# Patient Record
Sex: MALE | Race: WHITE
[De-identification: names, ages, dates, MRNs, and addresses within clinical notes are randomized per-mention and may not be internally consistent; named-entity substitution may affect disease eponyms.]

---

## 2018-09-07 ENCOUNTER — HOSPITAL ENCOUNTER (OUTPATIENT)
Dept: HOSPITAL 84 - D.CATH | Age: 83
Discharge: HOME | End: 2018-09-07
Attending: INTERNAL MEDICINE
Payer: MEDICARE

## 2018-09-07 VITALS — WEIGHT: 190.4 LBS | BODY MASS INDEX: 27.26 KG/M2 | BODY MASS INDEX: 27.26 KG/M2 | HEIGHT: 70 IN

## 2018-09-07 VITALS — DIASTOLIC BLOOD PRESSURE: 69 MMHG | SYSTOLIC BLOOD PRESSURE: 153 MMHG

## 2018-09-07 DIAGNOSIS — Z95.1: ICD-10-CM

## 2018-09-07 DIAGNOSIS — Z01.812: ICD-10-CM

## 2018-09-07 DIAGNOSIS — I25.119: Primary | ICD-10-CM

## 2018-09-07 LAB
ANION GAP SERPL CALC-SCNC: 11.4 MMOL/L (ref 8–16)
BUN SERPL-MCNC: 11 MG/DL (ref 7–18)
CALCIUM SERPL-MCNC: 9.2 MG/DL (ref 8.5–10.1)
CHLORIDE SERPL-SCNC: 104 MMOL/L (ref 98–107)
CO2 SERPL-SCNC: 27.2 MMOL/L (ref 21–32)
CREAT SERPL-MCNC: 1 MG/DL (ref 0.6–1.3)
ERYTHROCYTE [DISTWIDTH] IN BLOOD BY AUTOMATED COUNT: 13 % (ref 11.5–14.5)
GLUCOSE SERPL-MCNC: 105 MG/DL (ref 74–106)
HCT VFR BLD CALC: 48.7 % (ref 42–54)
HGB BLD-MCNC: 17 G/DL (ref 13.5–17.5)
LYMPHOCYTES NFR BLD AUTO: 28.6 % (ref 15–50)
MCH RBC QN AUTO: 31.2 PG (ref 26–34)
MCHC RBC AUTO-ENTMCNC: 34.9 G/DL (ref 31–37)
MCV RBC: 89.4 FL (ref 80–100)
NEUTROPHILS NFR BLD AUTO: 65 % (ref 40–80)
OSMOLALITY SERPL CALC.SUM OF ELEC: 274 MOSM/KG (ref 275–300)
PLATELET # BLD: 224 10X3/UL (ref 130–400)
PMV BLD AUTO: 10.2 FL (ref 7.4–10.4)
POTASSIUM SERPL-SCNC: 4.6 MMOL/L (ref 3.5–5.1)
RBC # BLD AUTO: 5.45 10X6/UL (ref 4.2–6.1)
SODIUM SERPL-SCNC: 138 MMOL/L (ref 136–145)
WBC # BLD AUTO: 7.2 10X3/UL (ref 4.8–10.8)

## 2018-09-07 PROCEDURE — 93459 L HRT ART/GRFT ANGIO: CPT

## 2018-09-17 ENCOUNTER — HOSPITAL ENCOUNTER (OUTPATIENT)
Dept: HOSPITAL 84 - D.CATH | Age: 83
Discharge: HOME | End: 2018-09-17
Attending: INTERNAL MEDICINE
Payer: MEDICARE

## 2018-09-17 VITALS — SYSTOLIC BLOOD PRESSURE: 142 MMHG | DIASTOLIC BLOOD PRESSURE: 61 MMHG

## 2018-09-17 VITALS — BODY MASS INDEX: 27.26 KG/M2 | WEIGHT: 190.4 LBS | BODY MASS INDEX: 27.26 KG/M2 | HEIGHT: 70 IN

## 2018-09-17 DIAGNOSIS — Z95.1: ICD-10-CM

## 2018-09-17 DIAGNOSIS — E78.5: ICD-10-CM

## 2018-09-17 DIAGNOSIS — Z95.5: ICD-10-CM

## 2018-09-17 DIAGNOSIS — I70.203: ICD-10-CM

## 2018-09-17 DIAGNOSIS — I10: ICD-10-CM

## 2018-09-17 DIAGNOSIS — I25.119: Primary | ICD-10-CM

## 2018-09-17 DIAGNOSIS — Z01.812: ICD-10-CM

## 2018-09-17 LAB
ANION GAP SERPL CALC-SCNC: 13.9 MMOL/L (ref 8–16)
BASOPHILS NFR BLD AUTO: 0.7 % (ref 0–2)
BUN SERPL-MCNC: 15 MG/DL (ref 7–18)
CALCIUM SERPL-MCNC: 8.9 MG/DL (ref 8.5–10.1)
CHLORIDE SERPL-SCNC: 104 MMOL/L (ref 98–107)
CO2 SERPL-SCNC: 23.8 MMOL/L (ref 21–32)
CREAT SERPL-MCNC: 0.8 MG/DL (ref 0.6–1.3)
EOSINOPHIL NFR BLD: 2.8 % (ref 0–7)
ERYTHROCYTE [DISTWIDTH] IN BLOOD BY AUTOMATED COUNT: 12.9 % (ref 11.5–14.5)
GLUCOSE SERPL-MCNC: 105 MG/DL (ref 74–106)
HCT VFR BLD CALC: 49.4 % (ref 42–54)
HGB BLD-MCNC: 17.1 G/DL (ref 13.5–17.5)
IMM GRANULOCYTES NFR BLD: 0.5 % (ref 0–5)
LYMPHOCYTES NFR BLD AUTO: 24.1 % (ref 15–50)
MCH RBC QN AUTO: 31.7 PG (ref 26–34)
MCHC RBC AUTO-ENTMCNC: 34.6 G/DL (ref 31–37)
MCV RBC: 91.7 FL (ref 80–100)
MONOCYTES NFR BLD: 13.3 % (ref 2–11)
NEUTROPHILS NFR BLD AUTO: 58.6 % (ref 40–80)
OSMOLALITY SERPL CALC.SUM OF ELEC: 274 MOSM/KG (ref 275–300)
PLATELET # BLD: 203 10X3/UL (ref 130–400)
PMV BLD AUTO: 11 FL (ref 7.4–10.4)
POTASSIUM SERPL-SCNC: 4.7 MMOL/L (ref 3.5–5.1)
RBC # BLD AUTO: 5.39 10X6/UL (ref 4.2–6.1)
SODIUM SERPL-SCNC: 137 MMOL/L (ref 136–145)
WBC # BLD AUTO: 8.4 10X3/UL (ref 4.8–10.8)

## 2019-03-10 ENCOUNTER — HOSPITAL ENCOUNTER (INPATIENT)
Dept: HOSPITAL 84 - D.ER | Age: 84
LOS: 2 days | Discharge: HOME | DRG: 378 | End: 2019-03-12
Attending: INTERNAL MEDICINE | Admitting: INTERNAL MEDICINE
Payer: MEDICARE

## 2019-03-10 VITALS — SYSTOLIC BLOOD PRESSURE: 108 MMHG | DIASTOLIC BLOOD PRESSURE: 63 MMHG

## 2019-03-10 VITALS — SYSTOLIC BLOOD PRESSURE: 92 MMHG | DIASTOLIC BLOOD PRESSURE: 58 MMHG

## 2019-03-10 VITALS — DIASTOLIC BLOOD PRESSURE: 75 MMHG | SYSTOLIC BLOOD PRESSURE: 123 MMHG

## 2019-03-10 VITALS — DIASTOLIC BLOOD PRESSURE: 65 MMHG | SYSTOLIC BLOOD PRESSURE: 132 MMHG

## 2019-03-10 VITALS — DIASTOLIC BLOOD PRESSURE: 74 MMHG | SYSTOLIC BLOOD PRESSURE: 133 MMHG

## 2019-03-10 VITALS — SYSTOLIC BLOOD PRESSURE: 106 MMHG | DIASTOLIC BLOOD PRESSURE: 65 MMHG

## 2019-03-10 VITALS — DIASTOLIC BLOOD PRESSURE: 70 MMHG | SYSTOLIC BLOOD PRESSURE: 95 MMHG

## 2019-03-10 VITALS
BODY MASS INDEX: 26.46 KG/M2 | BODY MASS INDEX: 26.46 KG/M2 | WEIGHT: 189.02 LBS | HEIGHT: 71 IN | HEIGHT: 71 IN | WEIGHT: 189.02 LBS | BODY MASS INDEX: 26.46 KG/M2

## 2019-03-10 VITALS — DIASTOLIC BLOOD PRESSURE: 61 MMHG | SYSTOLIC BLOOD PRESSURE: 111 MMHG

## 2019-03-10 VITALS — SYSTOLIC BLOOD PRESSURE: 102 MMHG | DIASTOLIC BLOOD PRESSURE: 89 MMHG

## 2019-03-10 VITALS — SYSTOLIC BLOOD PRESSURE: 113 MMHG | DIASTOLIC BLOOD PRESSURE: 77 MMHG

## 2019-03-10 VITALS — SYSTOLIC BLOOD PRESSURE: 119 MMHG | DIASTOLIC BLOOD PRESSURE: 62 MMHG

## 2019-03-10 DIAGNOSIS — Z95.1: ICD-10-CM

## 2019-03-10 DIAGNOSIS — I25.10: ICD-10-CM

## 2019-03-10 DIAGNOSIS — E78.5: ICD-10-CM

## 2019-03-10 DIAGNOSIS — K92.1: ICD-10-CM

## 2019-03-10 DIAGNOSIS — C85.90: ICD-10-CM

## 2019-03-10 DIAGNOSIS — K26.4: Primary | ICD-10-CM

## 2019-03-10 DIAGNOSIS — K31.5: ICD-10-CM

## 2019-03-10 DIAGNOSIS — K44.9: ICD-10-CM

## 2019-03-10 DIAGNOSIS — M19.90: ICD-10-CM

## 2019-03-10 LAB
ALBUMIN SERPL-MCNC: 3.8 G/DL (ref 3.4–5)
ALP SERPL-CCNC: 76 U/L (ref 46–116)
ALT SERPL-CCNC: 25 U/L (ref 10–68)
AMYLASE SERPL-CCNC: 62 U/L (ref 25–115)
ANION GAP SERPL CALC-SCNC: 13.6 MMOL/L (ref 8–16)
APPEARANCE UR: CLEAR
BASOPHILS NFR BLD AUTO: 0.2 % (ref 0–2)
BASOPHILS NFR BLD AUTO: 0.5 % (ref 0–2)
BILIRUB SERPL-MCNC: 0.55 MG/DL (ref 0.2–1.3)
BILIRUB SERPL-MCNC: NEGATIVE MG/DL
BUN SERPL-MCNC: 37 MG/DL (ref 7–18)
CALCIUM SERPL-MCNC: 9.1 MG/DL (ref 8.5–10.1)
CHLORIDE SERPL-SCNC: 103 MMOL/L (ref 98–107)
CO2 SERPL-SCNC: 27.6 MMOL/L (ref 21–32)
COLOR UR: (no result)
CREAT SERPL-MCNC: 1.1 MG/DL (ref 0.6–1.3)
EOSINOPHIL NFR BLD: 2 % (ref 0–7)
EOSINOPHIL NFR BLD: 2.1 % (ref 0–7)
ERYTHROCYTE [DISTWIDTH] IN BLOOD BY AUTOMATED COUNT: 13.1 % (ref 11.5–14.5)
ERYTHROCYTE [DISTWIDTH] IN BLOOD BY AUTOMATED COUNT: 13.4 % (ref 11.5–14.5)
GLOBULIN SER-MCNC: 3.3 G/L
GLUCOSE SERPL-MCNC: 114 MG/DL (ref 74–106)
GLUCOSE SERPL-MCNC: NEGATIVE MG/DL
HCT VFR BLD CALC: 41.4 % (ref 42–54)
HCT VFR BLD CALC: 45.6 % (ref 42–54)
HGB BLD-MCNC: 13.8 G/DL (ref 13.5–17.5)
HGB BLD-MCNC: 15.7 G/DL (ref 13.5–17.5)
IMM GRANULOCYTES NFR BLD: 2.1 % (ref 0–5)
IMM GRANULOCYTES NFR BLD: 2.4 % (ref 0–5)
INR PPP: 1.05 (ref 0.85–1.17)
KETONES UR STRIP-MCNC: NEGATIVE MG/DL
LIPASE SERPL-CCNC: 177 U/L (ref 73–393)
LYMPHOCYTES NFR BLD AUTO: 16.4 % (ref 15–50)
LYMPHOCYTES NFR BLD AUTO: 17.4 % (ref 15–50)
MCH RBC QN AUTO: 31.1 PG (ref 26–34)
MCH RBC QN AUTO: 31.2 PG (ref 26–34)
MCHC RBC AUTO-ENTMCNC: 33.3 G/DL (ref 31–37)
MCHC RBC AUTO-ENTMCNC: 34.4 G/DL (ref 31–37)
MCV RBC: 90.3 FL (ref 80–100)
MCV RBC: 93.5 FL (ref 80–100)
MONOCYTES NFR BLD: 10.6 % (ref 2–11)
MONOCYTES NFR BLD: 9.1 % (ref 2–11)
NEUTROPHILS NFR BLD AUTO: 68.6 % (ref 40–80)
NEUTROPHILS NFR BLD AUTO: 68.6 % (ref 40–80)
NITRITE UR-MCNC: NEGATIVE MG/ML
OSMOLALITY SERPL CALC.SUM OF ELEC: 288 MOSM/KG (ref 275–300)
PH UR STRIP: 5 [PH] (ref 5–6)
PLATELET # BLD: 237 10X3/UL (ref 130–400)
PLATELET # BLD: 282 10X3/UL (ref 130–400)
PMV BLD AUTO: 10.8 FL (ref 7.4–10.4)
PMV BLD AUTO: 11 FL (ref 7.4–10.4)
POTASSIUM SERPL-SCNC: 4.2 MMOL/L (ref 3.5–5.1)
PROT SERPL-MCNC: 7.1 G/DL (ref 6.4–8.2)
PROT UR-MCNC: NEGATIVE MG/DL
PROTHROMBIN TIME: 13.2 SECONDS (ref 11.6–15)
RBC # BLD AUTO: 4.43 10X6/UL (ref 4.2–6.1)
RBC # BLD AUTO: 5.05 10X6/UL (ref 4.2–6.1)
SODIUM SERPL-SCNC: 140 MMOL/L (ref 136–145)
SP GR UR STRIP: 1.02 (ref 1–1.02)
UROBILINOGEN UR-MCNC: NORMAL MG/DL
WBC # BLD AUTO: 12.9 10X3/UL (ref 4.8–10.8)
WBC # BLD AUTO: 14.1 10X3/UL (ref 4.8–10.8)

## 2019-03-10 NOTE — NUR
Shift assessment complete. Pt is A&O x4, with no complaints of pain or
discomfort. He is sitting up in bed with his glasses on, he has a ring and a
watch on his L wrist and hand. S1S2 audible, HR 63, NSR, BBB showing on
monitor. RR even and unlabored, clear lung sounds heard bilat throughout all
lobes. ABD soft and non-tender to touch, BS active x4. Emptied 250 mL clear
yellow urine. R AC PIV infusing NS @ 100 mL/hr and Protonix gtt @ 8 mg/hr.
Radial and pedal pulses palp. VSS. Call light in reach, bed in lowest
position. SCDs on, skin assessed, WNL. No further needs at this time. He
denies any needs. Will cont with POC.

## 2019-03-10 NOTE — NUR
PT RECIEVED TO UNIT AT THIS TIME. PT ALERT AND ORIENTED. STATES IS HAVING SOME
RT LOWER QUADRANT DISCOMFORT THAT IS DULL PAIN A LEVEL 2/10 AND STATES THAT HE
RCIEVED DEMEROL FOR PAIN AND BEFORE THAT HIS DISCOMFORT WAS A 6/10. NO ACUTE
DISTRESS NOTED. WILL CONTNIUE PLAN OF CARE.

## 2019-03-10 NOTE — NUR
SHIFT ASSESSMENT PERFORMED AT 1900 3/10/19, BACK DATED ON COMPUTER CHARTING BY
ACCIDENT TO 3/9/19, UNABLE TO CHANGE DATE ON [employer].

## 2019-03-10 NOTE — NUR
Wife and friend at bedside, he denies any needs at this time. VSS. Call light
in reach, bed in lowest position. Will cont with POC.

## 2019-03-11 VITALS — DIASTOLIC BLOOD PRESSURE: 62 MMHG | SYSTOLIC BLOOD PRESSURE: 138 MMHG

## 2019-03-11 VITALS — SYSTOLIC BLOOD PRESSURE: 115 MMHG | DIASTOLIC BLOOD PRESSURE: 65 MMHG

## 2019-03-11 VITALS — DIASTOLIC BLOOD PRESSURE: 60 MMHG | SYSTOLIC BLOOD PRESSURE: 99 MMHG

## 2019-03-11 VITALS — DIASTOLIC BLOOD PRESSURE: 56 MMHG | SYSTOLIC BLOOD PRESSURE: 107 MMHG

## 2019-03-11 VITALS — DIASTOLIC BLOOD PRESSURE: 64 MMHG | SYSTOLIC BLOOD PRESSURE: 101 MMHG

## 2019-03-11 VITALS — SYSTOLIC BLOOD PRESSURE: 126 MMHG | DIASTOLIC BLOOD PRESSURE: 76 MMHG

## 2019-03-11 VITALS — SYSTOLIC BLOOD PRESSURE: 142 MMHG | DIASTOLIC BLOOD PRESSURE: 62 MMHG

## 2019-03-11 VITALS — DIASTOLIC BLOOD PRESSURE: 74 MMHG | SYSTOLIC BLOOD PRESSURE: 110 MMHG

## 2019-03-11 VITALS — DIASTOLIC BLOOD PRESSURE: 69 MMHG | SYSTOLIC BLOOD PRESSURE: 104 MMHG

## 2019-03-11 VITALS — SYSTOLIC BLOOD PRESSURE: 108 MMHG | DIASTOLIC BLOOD PRESSURE: 63 MMHG

## 2019-03-11 VITALS — SYSTOLIC BLOOD PRESSURE: 105 MMHG | DIASTOLIC BLOOD PRESSURE: 67 MMHG

## 2019-03-11 VITALS — DIASTOLIC BLOOD PRESSURE: 65 MMHG | SYSTOLIC BLOOD PRESSURE: 115 MMHG

## 2019-03-11 VITALS — SYSTOLIC BLOOD PRESSURE: 95 MMHG | DIASTOLIC BLOOD PRESSURE: 64 MMHG

## 2019-03-11 VITALS — SYSTOLIC BLOOD PRESSURE: 117 MMHG | DIASTOLIC BLOOD PRESSURE: 68 MMHG

## 2019-03-11 VITALS — DIASTOLIC BLOOD PRESSURE: 65 MMHG | SYSTOLIC BLOOD PRESSURE: 141 MMHG

## 2019-03-11 VITALS — SYSTOLIC BLOOD PRESSURE: 107 MMHG | DIASTOLIC BLOOD PRESSURE: 83 MMHG

## 2019-03-11 LAB
ALBUMIN SERPL-MCNC: 3 G/DL (ref 3.4–5)
ALP SERPL-CCNC: 57 U/L (ref 46–116)
ALT SERPL-CCNC: 20 U/L (ref 10–68)
ANION GAP SERPL CALC-SCNC: 14.9 MMOL/L (ref 8–16)
BASOPHILS NFR BLD AUTO: 0.3 % (ref 0–2)
BILIRUB SERPL-MCNC: 0.85 MG/DL (ref 0.2–1.3)
BUN SERPL-MCNC: 25 MG/DL (ref 7–18)
CALCIUM SERPL-MCNC: 8.1 MG/DL (ref 8.5–10.1)
CHLORIDE SERPL-SCNC: 107 MMOL/L (ref 98–107)
CO2 SERPL-SCNC: 23.2 MMOL/L (ref 21–32)
CREAT SERPL-MCNC: 0.8 MG/DL (ref 0.6–1.3)
EOSINOPHIL NFR BLD: 1.4 % (ref 0–7)
EOSINOPHIL NFR BLD: 1.9 % (ref 0–7)
EOSINOPHIL NFR BLD: 2.3 % (ref 0–7)
ERYTHROCYTE [DISTWIDTH] IN BLOOD BY AUTOMATED COUNT: 13.4 % (ref 11.5–14.5)
GLOBULIN SER-MCNC: 2.6 G/L
GLUCOSE SERPL-MCNC: 107 MG/DL (ref 74–106)
HCT VFR BLD CALC: 36.9 % (ref 42–54)
HCT VFR BLD CALC: 37.5 % (ref 42–54)
HCT VFR BLD CALC: 37.5 % (ref 42–54)
HGB BLD-MCNC: 12.7 G/DL (ref 13.5–17.5)
HGB BLD-MCNC: 12.8 G/DL (ref 13.5–17.5)
HGB BLD-MCNC: 12.8 G/DL (ref 13.5–17.5)
IMM GRANULOCYTES NFR BLD: 1.7 % (ref 0–5)
IMM GRANULOCYTES NFR BLD: 1.8 % (ref 0–5)
IMM GRANULOCYTES NFR BLD: 1.8 % (ref 0–5)
LYMPHOCYTES NFR BLD AUTO: 16.9 % (ref 15–50)
LYMPHOCYTES NFR BLD AUTO: 18.7 % (ref 15–50)
LYMPHOCYTES NFR BLD AUTO: 20.9 % (ref 15–50)
MAGNESIUM SERPL-MCNC: 1.8 MG/DL (ref 1.8–2.4)
MCH RBC QN AUTO: 30.6 PG (ref 26–34)
MCH RBC QN AUTO: 30.8 PG (ref 26–34)
MCH RBC QN AUTO: 31 PG (ref 26–34)
MCHC RBC AUTO-ENTMCNC: 33.9 G/DL (ref 31–37)
MCHC RBC AUTO-ENTMCNC: 34.1 G/DL (ref 31–37)
MCHC RBC AUTO-ENTMCNC: 34.7 G/DL (ref 31–37)
MCV RBC: 89.3 FL (ref 80–100)
MCV RBC: 90.4 FL (ref 80–100)
MCV RBC: 90.4 FL (ref 80–100)
MONOCYTES NFR BLD: 8.6 % (ref 2–11)
MONOCYTES NFR BLD: 9.2 % (ref 2–11)
MONOCYTES NFR BLD: 9.5 % (ref 2–11)
NEUTROPHILS NFR BLD AUTO: 66.6 % (ref 40–80)
NEUTROPHILS NFR BLD AUTO: 67.4 % (ref 40–80)
NEUTROPHILS NFR BLD AUTO: 70.4 % (ref 40–80)
OSMOLALITY SERPL CALC.SUM OF ELEC: 284 MOSM/KG (ref 275–300)
PLATELET # BLD: 210 10X3/UL (ref 130–400)
PLATELET # BLD: 223 10X3/UL (ref 130–400)
PLATELET # BLD: 243 10X3/UL (ref 130–400)
PMV BLD AUTO: 10.5 FL (ref 7.4–10.4)
PMV BLD AUTO: 10.7 FL (ref 7.4–10.4)
PMV BLD AUTO: 11 FL (ref 7.4–10.4)
POTASSIUM SERPL-SCNC: 4.1 MMOL/L (ref 3.5–5.1)
PROT SERPL-MCNC: 5.6 G/DL (ref 6.4–8.2)
RBC # BLD AUTO: 4.13 10X6/UL (ref 4.2–6.1)
RBC # BLD AUTO: 4.15 10X6/UL (ref 4.2–6.1)
RBC # BLD AUTO: 4.15 10X6/UL (ref 4.2–6.1)
SODIUM SERPL-SCNC: 141 MMOL/L (ref 136–145)
WBC # BLD AUTO: 10.2 10X3/UL (ref 4.8–10.8)
WBC # BLD AUTO: 11.2 10X3/UL (ref 4.8–10.8)
WBC # BLD AUTO: 11.5 10X3/UL (ref 4.8–10.8)

## 2019-03-11 PROCEDURE — 0D798ZZ DILATION OF DUODENUM, VIA NATURAL OR ARTIFICIAL OPENING ENDOSCOPIC: ICD-10-PCS | Performed by: INTERNAL MEDICINE

## 2019-03-11 PROCEDURE — 0DB98ZX EXCISION OF DUODENUM, VIA NATURAL OR ARTIFICIAL OPENING ENDOSCOPIC, DIAGNOSTIC: ICD-10-PCS | Performed by: INTERNAL MEDICINE

## 2019-03-11 NOTE — NUR
Patient sleeping in bed with eyes closed, no s/s of distress at this time.
S1/S2 noted NSR on telemetry, breathing is even/unlabored on room air. All
pulses palpable with full ROM all extremities, denies pain or other needs at
this time. No BM at this time, all VSS and will continue to monitor.

## 2019-03-11 NOTE — NUR
Pt resting peacefully with no signs of acute distress noted. VSS. Call light
in reach, bed in lowest position. Will cont with POC.

## 2019-03-11 NOTE — NUR
Lab at bedside drawing CBC. He denies any needs at this time. VSS. Call light
in reach, will cont with POC.

## 2019-03-11 NOTE — MORECARE
CASE MANAGEMENT DISCHARGE SUMMARY
 
 
PATIENT: KUMAR YOUNG                   UNIT: [id number]
ACCOUNT#: [id number]                       ADM DATE: 03/10/19
AGE: 83     : 37  SEX: M            ROOM/BED: D.2306    
AUTHOR: BINH POLLARD                             PHYSICIAN:                               
 
REFERRING PHYSICIAN: LAUREL BABIN MD               
DATE OF SERVICE: 19
Discharge Plan
 
 
Patient Name: KUMAR YOUNG
[facility]
Encounter #: [id number]
Medical Record #: [id number]
: 1935
Planned Disposition: Home
Anticipated Discharge Date: 
 
Discharge Date: 
Expected LOS: 
Initial Reviewer: [id number]
Initial Review Date: 2019
Generated: 3/11/19   6:56 pm 
 DCPIA - Discharge Planning Initial Assessment
 
Updated by [id number]: Flower Ziegler on 3/11/19   5:54 pm
*  Is the patient Alert and Oriented?
Yes
*  How many steps to enter\exit or inside your home?
 
*  PCP
DR. LYNN -[facility]
*  Pharmacy
[facility]
*  Preadmission Environment
Home with Family
*  ADLs
Independent
*  Equipment
None
*  List name and contact numbers for known caregivers / representatives who 
currently or will assist patient after discharge:
MADHU YOUNG - WIFE- [phone]
*  Verbal permission to speak to the caregivers and representatives has been 
obtained from the patient.
Yes
*  Community resources currently utilized
 
None
*  Additional services required to return to the preadmission environment?
No
*  Can the patient safely return to the preadmission environment?
Yes
*  Has this patient been hospitalized within the prior 30 days at any 
hospital?
No
 
 
 
 
 
 
 
Last DP export: 3/11/19   4:48 p
Patient Name: KUMAR YOUNG
Encounter #: [id number]
Page [id number]
 
 
 
 
 
Electronically Signed by BINH POLLARD on 19 at 1756
 
 
 
 
 
 
**All edits/amendments must be made on the electronic document**
 
DICTATION DATE: 19     : RODRIGUEZ  19     
RPT#: [id number]                                DC DATE:        
                                               STATUS: ADM IN  
[facility]
 [place] [address]
***END OF REPORT***

## 2019-03-11 NOTE — NUR
Patient resting in bed with eyes open, HS meds given without difficulty.
Denies pain or other needs at this time, all VSS and will continue to monitor.

## 2019-03-11 NOTE — NUR
Reassessment complete. No changes in pt condition. No BM or bleeding seen at
this point. NS infusing @ 100 mL/hr, Protonix gtt @ 8 mg/hr. He is in good
spirits. VSS. Call light in reach, he is lying in bed playing on his phone. He
denies any pain. See flowsheet for further details. Will cont with POC.

## 2019-03-11 NOTE — NUR
Received patient resting in bed with eyes open, assessment completed per
flowsheet. Patient AO x4, follows instructions/answers appropriately. S1/S2
noted NSR on telemetry with HR 70, rythmic and regular. Breathing is
even/unlabored on room air with O2 sat 95%, lung sounds clear throughout. All
pulses palpable with cap refill < 3 sec, full ROM all extremities with skin
warm/dry. Denies pain or other needs at this time, see flowsheet for details.
All VSS and will continue to monitor.

## 2019-03-11 NOTE — MORECARE
CASE MANAGEMENT DISCHARGE SUMMARY
 
 
PATIENT: KUMAR YOUNG                   UNIT: [id number]
ACCOUNT#: [id number]                       ADM DATE: 03/10/19
AGE: 83     : 37  SEX: M            ROOM/BED: D.2306    
AUTHOR: LATRICE,DOC                             PHYSICIAN:                               
 
REFERRING PHYSICIAN: LAUREL BABIN MD               
DATE OF SERVICE: 19
Discharge Plan
 
 
Patient Name: KUMAR YOUNG
Facility: [facility]:[place]
Encounter #: [id number]
Medical Record #: [id number]
: 1935
Planned Disposition: Home
Anticipated Discharge Date: 
 
Discharge Date: 
Expected LOS: 
Initial Reviewer: [email or web address]
Initial Review Date: 2019
Generated: 3/11/19   7:05 pm 
Comments
 
DCP- Discharge Planning
 
Updated by [email or web address]: Flower Ziegler on 3/11/19   5:03 pm CT
Patient Name: KUMAR YOUNG                                     
Admission Status: ER   
Accout number: [id number]                              
Admission Date: 03-   
: 1935                                                        
Admission Diagnosis:   
Attending: LAUREL BABIN                                                
Current LOS:  1   
  
Anticipated DC Date:    
Planned Disposition: Home   
Primary Insurance: MEDICARE A & B   
  
  
Discharge Planning Comments: CM met with patient and spouse at bedside. 
Patient states he lives at home with is spouse (Josselyn). He plans on returning 
to their home upon discharge.  He states he feels safe at his home. He states 
he will have family drive him home upon discharge. He denies any discharge 
needs at this time. CM will continue to follow and assist as needed with 
 
discharge planning / needs.  
  
  
  
  
: Flower Ziegler
 DCPIA - Discharge Planning Initial Assessment
 
Updated by [id number]: Flower Ziegler on 3/11/19   5:54 pm
*  Is the patient Alert and Oriented?
Yes
*  How many steps to enter\exit or inside your home?
 
*  PCP
DR. LYNN [facility]
*  Pharmacy
[facility]
*  Preadmission Environment
Home with Family
*  ADLs
Independent
*  Equipment
None
*  List name and contact numbers for known caregivers / representatives who 
currently or will assist patient after discharge:
JOSSELYN YOUNG - WIFE- [phone]
*  Verbal permission to speak to the caregivers and representatives has been 
obtained from the patient.
Yes
*  Community resources currently utilized
None
*  Additional services required to return to the preadmission environment?
No
*  Can the patient safely return to the preadmission environment?
Yes
*  Has this patient been hospitalized within the prior 30 days at any 
hospital?
No
 
 
 
 
 
 
 
Last DP export: 3/11/19   4:56 p
Patient Name: KUMAR YOUNG
 
Encounter #: [id number]
Page [id number]
 
 
 
 
 
Electronically Signed by BINH POLLARD on 19 at 1805
 
 
 
 
 
 
**All edits/amendments must be made on the electronic document**
 
DICTATION DATE: 19     : RODRIGUEZ  19     
RPT#: [id number]                                DC DATE:        
                                               STATUS: ADM IN  
[facility]
 [place] [address]
***END OF REPORT***

## 2019-03-11 NOTE — MORECARE
CASE MANAGEMENT DISCHARGE SUMMARY
 
 
PATIENT: KUMAR YOUNG                   UNIT: [id number]
ACCOUNT#: [id number]                       ADM DATE: 03/10/19
AGE: 83     : 37  SEX: M            ROOM/BED: D.2306    
AUTHOR: BINH POLLARD                             PHYSICIAN:                               
 
REFERRING PHYSICIAN: LAUREL BABIN MD               
DATE OF SERVICE: 19
Discharge Plan
 
 
Patient Name: KUMAR YOUNG
[facility]
Encounter #: [id number]
Medical Record #: [id number]
: 1935
Planned Disposition: Home
Anticipated Discharge Date: 
 
Discharge Date: 
Expected LOS: 
Initial Reviewer: [email or web address]
Initial Review Date: 2019
Generated: 3/11/19   6:48 pm 
  
 
 
 
 
 
 
 
Patient Name: KUMAR YOUNG
 
Encounter #: [id number]
Page [id number]
 
 
 
 
 
Electronically Signed by BINH POLLARD on 19 at 1748
 
 
 
 
 
 
**All edits/amendments must be made on the electronic document**
 
DICTATION DATE: 19     : RODRIGUEZ  19     
RPT#: [id number]                                DC DATE:        
                                               STATUS: ADM IN  
[facility]
[address] [place] [address]
***END OF REPORT***

## 2019-03-12 VITALS — SYSTOLIC BLOOD PRESSURE: 114 MMHG | DIASTOLIC BLOOD PRESSURE: 58 MMHG

## 2019-03-12 VITALS — SYSTOLIC BLOOD PRESSURE: 107 MMHG | DIASTOLIC BLOOD PRESSURE: 56 MMHG

## 2019-03-12 LAB
ALBUMIN SERPL-MCNC: 3 G/DL (ref 3.4–5)
ALP SERPL-CCNC: 54 U/L (ref 46–116)
ALT SERPL-CCNC: 22 U/L (ref 10–68)
ANION GAP SERPL CALC-SCNC: 10.5 MMOL/L (ref 8–16)
BASOPHILS NFR BLD AUTO: 0.2 % (ref 0–2)
BILIRUB SERPL-MCNC: 0.72 MG/DL (ref 0.2–1.3)
BUN SERPL-MCNC: 20 MG/DL (ref 7–18)
CALCIUM SERPL-MCNC: 8.1 MG/DL (ref 8.5–10.1)
CHLORIDE SERPL-SCNC: 105 MMOL/L (ref 98–107)
CO2 SERPL-SCNC: 26.4 MMOL/L (ref 21–32)
CREAT SERPL-MCNC: 1.1 MG/DL (ref 0.6–1.3)
EOSINOPHIL NFR BLD: 1.8 % (ref 0–7)
ERYTHROCYTE [DISTWIDTH] IN BLOOD BY AUTOMATED COUNT: 13.2 % (ref 11.5–14.5)
GLOBULIN SER-MCNC: 2.6 G/L
GLUCOSE SERPL-MCNC: 101 MG/DL (ref 74–106)
HCT VFR BLD CALC: 36 % (ref 42–54)
HGB BLD-MCNC: 12.2 G/DL (ref 13.5–17.5)
IMM GRANULOCYTES NFR BLD: 1.1 % (ref 0–5)
LYMPHOCYTES NFR BLD AUTO: 14 % (ref 15–50)
MAGNESIUM SERPL-MCNC: 1.9 MG/DL (ref 1.8–2.4)
MCH RBC QN AUTO: 30.6 PG (ref 26–34)
MCHC RBC AUTO-ENTMCNC: 33.9 G/DL (ref 31–37)
MCV RBC: 90.2 FL (ref 80–100)
MONOCYTES NFR BLD: 10.4 % (ref 2–11)
NEUTROPHILS NFR BLD AUTO: 72.5 % (ref 40–80)
OSMOLALITY SERPL CALC.SUM OF ELEC: 278 MOSM/KG (ref 275–300)
PLATELET # BLD: 218 10X3/UL (ref 130–400)
PMV BLD AUTO: 10.6 FL (ref 7.4–10.4)
POTASSIUM SERPL-SCNC: 3.9 MMOL/L (ref 3.5–5.1)
PROT SERPL-MCNC: 5.6 G/DL (ref 6.4–8.2)
RBC # BLD AUTO: 3.99 10X6/UL (ref 4.2–6.1)
SODIUM SERPL-SCNC: 138 MMOL/L (ref 136–145)
WBC # BLD AUTO: 11.6 10X3/UL (ref 4.8–10.8)

## 2019-03-12 NOTE — MORECARE
CASE MANAGEMENT DISCHARGE SUMMARY
 
 
PATIENT: KUMAR YOUNG                   UNIT: [id number]
ACCOUNT#: [id number]                       ADM DATE: 03/10/19
AGE: 83     : 37  SEX: M            ROOM/BED: D.2306    
AUTHOR: BINH POLLARD                             PHYSICIAN:                               
 
REFERRING PHYSICIAN: LAUREL BABIN MD               
DATE OF SERVICE: 19
Discharge Plan
 
 
Patient Name: KUMAR YOUNG
Facility: [facility]:[place]
Encounter #: [id number]
Medical Record #: [id number]
: 1935
Planned Disposition: Home
Anticipated Discharge Date: 
 
Discharge Date: 2019
Expected LOS: 
Initial Reviewer: [email or web address]
Initial Review Date: 2019
Generated: 3/12/19  11:31 am 
Comments
 
DCP- Discharge Planning
 
Updated by [id number]: Flower Ziegler on 3/12/19   9:29 am CT
Patient Name:  KUMAR YOUNG   
Encounter No:  [id number]   
:  1935   
Primary Insurance:  MEDICARE A & B  
Anticipated DC Date:    
Planned Disposition:  Home  
External Planned Provider: :   
  
  
DCP follow-up note: Patient and family in agreement with discharge plan. No 
changes to plan. Case management will follow and assist as needed. D/C IMM 
explained and served 3/12/19 @0950  
  
Flower Ziegler
DCP- Discharge Planning
 
Updated by [id number]: Flower Ziegler on 3/11/19   5:03 pm CT
Patient Name: KUMAR YOUNG                                     
Admission Status: ER   
 
Accout number: [id number]                              
Admission Date: 03-   
: 1935                                                        
Admission Diagnosis:   
Attending: LAUREL BABIN                                                
Current LOS:  1   
  
Anticipated DC Date:    
Planned Disposition: Home   
Primary Insurance: MEDICARE A & B   
  
  
Discharge Planning Comments: CM met with patient and spouse at bedside. 
Patient states he lives at home with is spouse (Josselyn). He plans on returning 
to their home upon discharge.  He states he feels safe at his home. He states 
he will have family drive him home upon discharge. He denies any discharge 
needs at this time. CM will continue to follow and assist as needed with 
discharge planning / needs.  
  
  
  
  
: Flower Ziegler
 DCPIA - Discharge Planning Initial Assessment
 
Updated by [id number]: Flower Ziegler on 3/11/19   5:54 pm
*  Is the patient Alert and Oriented?
Yes
*  How many steps to enter\exit or inside your home?
 
*  PCP
DR. LYNN -[facility]
*  [facility]
*  Preadmission Environment
Home with Family
*  ADLs
Independent
*  Equipment
None
*  List name and contact numbers for known caregivers / representatives who 
currently or will assist patient after discharge:
JOSSELYN YOUNG - WIFE- [phone]
*  Verbal permission to speak to the caregivers and representatives has been 
obtained from the patient.
Yes
*  Community resources currently utilized
None
*  Additional services required to return to the preadmission environment?
No
*  Can the patient safely return to the preadmission environment?
Yes
 
*  Has this patient been hospitalized within the prior 30 days at any 
hospital?
No
 
 
 
 
 
Coverage Notice
 
Reviewer: [email or web address] - Flower Ziegler
 
Notice Issued Date-Time: 2019   9:50
Notice Type: IM Discharge Notice
 
Notice Delivered To: Patient
Relationship to Patient: Self
Representative Name: 
 
Delivery Method: HAND - Hand Delivered
Marlyn Days:
Prior Verbal Notification: 
 
Recipient Understood Notice: Yes
Recipient Signature: Yes
Med Rec Note Co-signed by Attending:
 
Coverage Notice Comment:  
 
Last DP export: 3/11/19   5:05 p
Patient Name: KUMAR YOUNG
Encounter #: [id number]
Page [id number]
 
 
 
 
 
Electronically Signed by BINH POLLARD on 19 at 1031
 
 
 
 
 
 
**All edits/amendments must be made on the electronic document**
 
DICTATION DATE: 19 1030     : RODRIGUEZ  19 1030     
RPT#: [id number]                                DC DATE:19
                                               STATUS: DIS IN  
[facility]
[address] [place] [address]
***END OF REPORT***

## 2019-03-12 NOTE — MORECARE
CASE MANAGEMENT DISCHARGE SUMMARY
 
 
PATIENT: KUMAR YOUNG                   UNIT: [id number]
ACCOUNT#: [id number]                       ADM DATE: 03/10/19
AGE: 83     : 37  SEX: M            ROOM/BED: D.2306    
AUTHOR: BINH POLLARD                             PHYSICIAN:                               
 
REFERRING PHYSICIAN: LAUREL BABIN MD               
DATE OF SERVICE: 19
Discharge Plan
 
 
Patient Name: KUMAR YOUNG
Facility: [facility]
Encounter #: [id number]
Medical Record #: [id number]
: 1935
Planned Disposition: Home
Anticipated Discharge Date: 
 
Discharge Date: 2019
Expected LOS: 
Initial Reviewer: [email or web address]
Initial Review Date: 2019
Generated: 3/12/19  11:41 am 
Comments
 
DCP- Discharge Planning
 
Updated by [id number]: Flower Ziegler on 3/12/19   9:29 am CT
Patient Name:  KUMAR YOUNG   
Encounter No:  [id number]   
:  1935   
Primary Insurance:  MEDICARE A & B  
Anticipated DC Date:    
Planned Disposition:  Home  
External Planned Provider: :   
  
  
DCP follow-up note: Patient and family in agreement with discharge plan. No 
changes to plan. Case management will follow and assist as needed. D/C IMM 
explained and served 3/12/19 @0950  
  
Flower Ziegler
DCP- Discharge Planning
 
Updated by [id number]: Flower Ziegler on 3/11/19   5:03 pm CT
Patient Name: KUMAR YOUNG                                     
Admission Status: ER   
 
Accout number: [id number]                              
Admission Date: 03-   
: 1935                                                        
Admission Diagnosis:   
Attending: LAUERL BABIN                                                
Current LOS:  1   
  
Anticipated DC Date:    
Planned Disposition: Home   
Primary Insurance: MEDICARE A & B   
  
  
Discharge Planning Comments: CM met with patient and spouse at bedside. 
Patient states he lives at home with is spouse (Josselyn). He plans on returning 
to their home upon discharge.  He states he feels safe at his home. He states 
he will have family drive him home upon discharge. He denies any discharge 
needs at this time. CM will continue to follow and assist as needed with 
discharge planning / needs.  
  
  
  
  
: Flower Ziegler
 DCPIA - Discharge Planning Initial Assessment
 
Updated by [id number]: Flower Ziegler on 3/11/19   5:54 pm
*  Is the patient Alert and Oriented?
Yes
*  How many steps to enter\exit or inside your home?
 
*  PCP
DR. LYNN -[facility]
*  Pharmacy
[facility]
*  Preadmission Environment
Home with Family
*  ADLs
Independent
*  Equipment
None
*  List name and contact numbers for known caregivers / representatives who 
currently or will assist patient after discharge:
JOSSELYN YOUNG - WIFE- [phone]
*  Verbal permission to speak to the caregivers and representatives has been 
obtained from the patient.
Yes
*  Community resources currently utilized
None
*  Additional services required to return to the preadmission environment?
No
*  Can the patient safely return to the preadmission environment?
Yes
 
*  Has this patient been hospitalized within the prior 30 days at any 
hospital?
No
 
 
 
 
 
Coverage Notice
 
Reviewer: [id number] - Flower Ziegler
 
Notice Issued Date-Time: 2019   9:50
Notice Type: IM Discharge Notice
 
Notice Delivered To: Patient
Relationship to Patient: Self
Representative Name: 
 
Delivery Method: HAND - Hand Delivered
Marlyn Days:
Prior Verbal Notification: 
 
Recipient Understood Notice: Yes
Recipient Signature: Yes
Med Rec Note Co-signed by Attending:
 
Coverage Notice Comment:  
 
Last DP export: 3/12/19   9:31 a
Patient Name: KUMAR YOUNG
Encounter #: [id number]
Page [phone]
 
 
 
 
 
Electronically Signed by BINH POLLARD on 19 at 1041
 
 
 
 
 
 
**All edits/amendments must be made on the electronic document**
 
DICTATION DATE: 19 1040     : RODRIGUEZ  19 1040     
RPT#: [id number]                                DC DATE:19
                                               STATUS: DIS IN  
[facility]
[address] [place] [address]
***END OF REPORT***

## 2019-03-12 NOTE — NUR
Patient sleeping in bed with eyes closed, no changes noted. Denies pain or
other needs, all VSS and will continue to monitor.

## 2019-03-12 NOTE — NUR
RAC PIV DC'D AT THIS TIME WITH CATHETER TIP INTACT. DISCHARGE INSTRUCTIONS
GIVEN TO PATIENT. SPOUSE AT BEDSIDE. DISCHARGED AT THIS TIME. PERSONAL
BELONGINGS SENT WITH PATIENT.

## 2019-03-12 NOTE — NUR
Patient sleeping in bed with eyes closed, no s/s of distress. NSR on telemetry
with HR 70, rythmic and regular. Breathing is even/unlabored on room air with
o2 sat 95%, lung sounds clear throughout. All pulses palpable with cap refill
< 3 sec, skin warm/dry. No BM at this time, no further needs and will continue
to monitor.

## 2019-03-12 NOTE — NUR
Patient sleeping in bed with eyes closed, no s/s of distress at this time. AM
labs collected without difficulty, denies pain or other needs at this time and
will continue to monitor.

## 2019-06-08 ENCOUNTER — HOSPITAL ENCOUNTER (OUTPATIENT)
Dept: HOSPITAL 84 - D.ER | Age: 84
Setting detail: OBSERVATION
LOS: 1 days | Discharge: HOME | End: 2019-06-09
Attending: INTERNAL MEDICINE | Admitting: INTERNAL MEDICINE
Payer: MEDICARE

## 2019-06-08 VITALS — DIASTOLIC BLOOD PRESSURE: 61 MMHG | SYSTOLIC BLOOD PRESSURE: 123 MMHG

## 2019-06-08 VITALS — DIASTOLIC BLOOD PRESSURE: 68 MMHG | SYSTOLIC BLOOD PRESSURE: 119 MMHG

## 2019-06-08 VITALS — SYSTOLIC BLOOD PRESSURE: 149 MMHG | DIASTOLIC BLOOD PRESSURE: 60 MMHG

## 2019-06-08 VITALS — SYSTOLIC BLOOD PRESSURE: 139 MMHG | DIASTOLIC BLOOD PRESSURE: 71 MMHG

## 2019-06-08 VITALS — SYSTOLIC BLOOD PRESSURE: 139 MMHG | DIASTOLIC BLOOD PRESSURE: 63 MMHG

## 2019-06-08 VITALS — DIASTOLIC BLOOD PRESSURE: 59 MMHG | SYSTOLIC BLOOD PRESSURE: 107 MMHG

## 2019-06-08 VITALS — BODY MASS INDEX: 26.2 KG/M2 | BODY MASS INDEX: 26.2 KG/M2

## 2019-06-08 DIAGNOSIS — I24.9: ICD-10-CM

## 2019-06-08 DIAGNOSIS — I25.110: Primary | ICD-10-CM

## 2019-06-08 DIAGNOSIS — C85.90: ICD-10-CM

## 2019-06-08 LAB
ALBUMIN SERPL-MCNC: 3.7 G/DL (ref 3.4–5)
ALP SERPL-CCNC: 81 U/L (ref 46–116)
ALT SERPL-CCNC: 33 U/L (ref 10–68)
ANION GAP SERPL CALC-SCNC: 10.3 MMOL/L (ref 8–16)
APTT BLD: 25.9 SECONDS (ref 22.8–39.4)
BASOPHILS NFR BLD AUTO: 0.2 % (ref 0–2)
BILIRUB SERPL-MCNC: 0.5 MG/DL (ref 0.2–1.3)
BUN SERPL-MCNC: 21 MG/DL (ref 7–18)
CALCIUM SERPL-MCNC: 9.4 MG/DL (ref 8.5–10.1)
CHLORIDE SERPL-SCNC: 103 MMOL/L (ref 98–107)
CK MB SERPL-MCNC: 1.4 U/L (ref 0–3.6)
CK MB SERPL-MCNC: 1.6 U/L (ref 0–3.6)
CK SERPL-CCNC: 37 UL (ref 21–232)
CK SERPL-CCNC: 53 UL (ref 21–232)
CO2 SERPL-SCNC: 31.3 MMOL/L (ref 21–32)
CREAT SERPL-MCNC: 1 MG/DL (ref 0.6–1.3)
EOSINOPHIL NFR BLD: 1.6 % (ref 0–7)
ERYTHROCYTE [DISTWIDTH] IN BLOOD BY AUTOMATED COUNT: 13.5 % (ref 11.5–14.5)
GLOBULIN SER-MCNC: 3.4 G/L
GLUCOSE SERPL-MCNC: 142 MG/DL (ref 74–106)
HCT VFR BLD CALC: 49.1 % (ref 42–54)
HGB BLD-MCNC: 16.8 G/DL (ref 13.5–17.5)
IMM GRANULOCYTES NFR BLD: 1 % (ref 0–5)
INR PPP: 1.02 (ref 0.85–1.17)
LYMPHOCYTES NFR BLD AUTO: 17.1 % (ref 15–50)
MAGNESIUM SERPL-MCNC: 2.1 MG/DL (ref 1.8–2.4)
MCH RBC QN AUTO: 30.2 PG (ref 26–34)
MCHC RBC AUTO-ENTMCNC: 34.2 G/DL (ref 31–37)
MCV RBC: 88.2 FL (ref 80–100)
MONOCYTES NFR BLD: 11 % (ref 2–11)
NEUTROPHILS NFR BLD AUTO: 69.1 % (ref 40–80)
OSMOLALITY SERPL CALC.SUM OF ELEC: 283 MOSM/KG (ref 275–300)
PLATELET # BLD: 249 10X3/UL (ref 130–400)
PMV BLD AUTO: 11.3 FL (ref 7.4–10.4)
POTASSIUM SERPL-SCNC: 4.6 MMOL/L (ref 3.5–5.1)
PROT SERPL-MCNC: 7.1 G/DL (ref 6.4–8.2)
PROTHROMBIN TIME: 12.9 SECONDS (ref 11.6–15)
RBC # BLD AUTO: 5.57 10X6/UL (ref 4.2–6.1)
SODIUM SERPL-SCNC: 140 MMOL/L (ref 136–145)
TROPONIN I SERPL-MCNC: < 0.017 NG/ML (ref 0–0.06)
TROPONIN I SERPL-MCNC: < 0.017 NG/ML (ref 0–0.06)
WBC # BLD AUTO: 12.2 10X3/UL (ref 4.8–10.8)

## 2019-06-08 NOTE — HEMODYNAMI
PATIENT:KUMAR YOUNG                                MEDICAL RECORD: [id number]
: 37                                            LOCATION:[facility]     D.2137
ACCT# [id number]                                       ADMISSION DATE: 19
 
 
 Generatedon:201913:49
Patient name: KUMAR YOUNG Patient #: [id number] Visit #: [id number] SSN: YOB: 1935 Date
of study: 2019
Page: Of
Hemodynamic Procedure Report
****************************
Patient Data
Patient Demographics
Procedure consent was obtained
First Name: KUMAR         Gender: Male
Last Name: HANNAH          : 1935
Middle Initial: C           Age: 84 year(s)
Patient #: [id number]       Race: Unknown
Visit #: [id number]
Accession #:
[id number]
Additional ID: [id number]
Contact details
Address: [address] [place] [address]     Phone: [phone]
State: [place]
City: [place]
Zip code: [address]
Past Medical History
Allergies
Allergen        Reaction        Date         Comments
Reported
Penicillins                     2018
Other allergy                   2018    PCN
Other allergy                   2019     PCN
Admission
Admission Data
Admission Date: 2019    Admission Time: 15:56
Room #: D.2137
Lab Results
Lab Result Date: 2019   Lab Result Time: 3:03
Biochemistry
Name         Units    Result                Min      Max
BUN          mg/dl    17       --(---*)--   7        18
Creatinine   mg/dl    0.9      --(-*--)--   0.6      1.3
CBC
Name         Units    Result                Min      Max
Hematocrit   %        47.4     --(-*--)--   42       54
Hemoglobin   g/dl     16.5     --(--*-)--   13.5     17.5
Procedure
Procedure Types
Cath Procedure
Diagnostic Procedure
LHC
LHC w/Coronaries w/Grafts
Sedation Charges
Moderate Sedation up to 30 minutes
Procedure Description
Procedure Date
 
Procedure Date: 2019
Procedure Start Time: 13:18
Procedure End Time: 13:46
Procedure Staff
Name                            Function
Cornell Sher MD              Performing Physician
Jarad Kraft RT                  Monitor
Tom Mathew RN                Nurse
Reynaldo Garcia RT                  Scrub
Procedure Data
Cath Procedure
Fluoroscopy
Diagnostic fluoroscopy      Total fluoroscopy Time: 9.9
time: 9.9 min               min
Diagnostic fluoroscopy      Total fluoroscopy dose:
dose: 1080 mGy              1080 mGy
Contrast Material
Contrast Material Type                       Amount (ml)
Isovue 370                                   188
Entry Location
Entry     Primary  Successful  Side  Size  Upsize 1   Upsize Entry    Closure Lemus
ccessful  Closure
Location                             (Fr)  (Fr)       2 (Fr) Remarks  Device    
          Remarks
Femoral                        Right 5 Fr  6 Fr       6 Fr            Exoseal
artery                                     Mid-Length Short
Estimated blood loss: 5 ml
Diagnostic catheters
Device Type               Used For           End Catheter
Placement
MULTIPACK JL 4.0 5Fr      Procedure
catheter
MULTIPACK 3DRC 5Fr        Procedure
catheter
DIAGNOSTIC JL 5 5Fr       Procedure
catheter (608356P)
MULTIPACK JL 4.0 5Fr      Procedure
catheter
MULTIPACK 3DRC 5Fr        Procedure
catheter
DIAGNOSTIC JL 5 5Fr       Procedure
catheter ([id number])
MULTIPACK Pigtail 5 Fr    Procedure
catheter
Procedure Complications
No complications
Procedure Medications
Medication           Administration Route Dosage
Oxygen               etCO2 Nasal cannula  2 l/min
Heparin Flush Bag    added to field       2 bags
(1000units/500ml NS)
0.9% NaCl            I.V.                 100 ml/hr
Lidocaine 2%         added to field       20
Fentanyl             I.V.                 50 mcg
Versed               I.V.                 1 mg
Fentanyl             I.V.                 50 mcg
Versed               I.V.                 1 mg
Hemodynamics
Rest
HGB: 16.5 (g/dl) Heart Rate: 68 (bpm)
 
Pressure Samples
Time     Site     Value (mmHg) Purpose      Heart      Use
Rate(bpm)
13:42    LV       114/8,11     Snapshot     78
13:42    AO       107/69(85)   Pullback     72
13:42    LV       112/12,30    Pullback     72
Gradients
Valve  Time  Site 1    Site 2     Mean    SEP/DFP    Peak To Heart  Use
(mmHg)  (sec/min)  Peak    Rate
(mmHg)  (bpm)
Aortic 13:42 LV        AO         6       13         5       72
112/12,30 107/69(85)
Calculations
Valve    P-P      Mean      Valve     Index  Valve    Source
Name              Gradient  Area             Flow
(cm2)
Aortic   5        6
5        6
Snapshots
Pre Cath      Intra         NCS           Post Cath
Vital Signs
Time     Heart  Resp   SPO2 etCO2   NIBP (mmHg) Rhythm  Pain    Sedation
Rate   (ipm)  (%)  (mmHg)                      Status  Level
(bpm)
13:04:18 68     17     96   0       121/78(94)  NSR     0 (11)  10(A)
, No
pain
13:08:22 69     17     96   0       116/83(107) NSR     0 (11)  10(A)
, No
pain
13:12:34 69     16     94   0       104/62(78)  NSR     0 (11)  9(A)
, No
pain
13:17:27 65     17     95   0       112/69(85)  NSR     0 (11)  9(A)
, No
pain
13:21:35 73     17     97   1.4     117/70(83)  NSR     0 (11)  9(A)
, No
pain
13:25:43 65     16     97   22.4    111/71(79)  NSR     0 (11)  9(A)
, No
pain
13:29:53 67     16     98   30.7    105/63(84)  NSR     0 (11)  9(A)
, No
pain
13:33:59 65     17     98   23.1    108/65(86)  NSR     0 (11)  9(A)
, No
pain
13:38:08 73     17     97   38.1    109/62(71)  NSR     0 (11)  9(A)
, No
pain
13:42:12 74     16     96   36.6    104/76(97)  NSR     0 (11)  9(A)
, No
pain
13:44:46 69     17     96   32.9    111/70(99)  NSR     0 (11)  10(A)
, No
pain
Medications
Time     Medication       Route   Dose  Verified Delivered Reason     Notes  Eff
ectiveness
 
by       by
13:10:47 Oxygen           etCO2   2     Cornell  Tom    Per
Nasal   l/min [facility] Mathew RN physician
cannula       MD
13:10:55 Heparin Flush    added   2     Cornell  Tom    used for
Bag              to      bags  [facility] RN procedure
(1000units/500ml field         MD
NS)
13:11:04 0.9% NaCl        I.V.    100   Cornell Lalay    Per
ml/hr [facility] RN physician
MD
13:11:13 Lidocaine 2%     added   20ml  Cornell Lalay    for local
to      vial  [facility] RN anesthetic
field         MD
13:11:22 Fentanyl         I.V.    50    Cornell Santos    for
[facility]   [facility] RN sedation
MD
13:11:29 Versed           I.V.    1 mg  Cornell Santos    for
[facility] RN sedation
MD
13:17:06 Fentanyl         I.V.    50    Cornell Santos    for
[facility]   [facility] RN sedation
MD
13:17:10 Versed           I.V.    1 mg  Cornell Santos    for
[facility] Mathew RN sedation
MD
Procedure Log
Time     Note
12:36:37 Reynaldo Garcia RT(R) sent for patient. Start room use.
12:36:38 Time tracking: Call back (After hours or weekends)
12:36:43 Plan of Care:Hemodynamics will remain stable., Cardiac
rhythm will remain stable., Comfort level will be
maintained., Respiratory function will remain
adequate., Patient/ family verbilizes understanding of
procedure., Procedure tolerated without complication.,
Recovers from procedure without complications..
12:58:36 Patient received from Med II to CCL 1 Alert and
oriented. Tansferred to table in Supine position.
12:58:37 Warm blankets applied, and ernesto hugger turned on for
patient comfort.
12:58:38 Correct patient and procedure confirmed by team.
12:58:39 Signed procedure consent form obtained from patient.
12:58:40 ECG and BP/O2 sat monitors applied to patient.
12:58:41 Pre-procedure instructions explained to patient.
12:58:42 Pre-op teaching completed and patient verbalized
understanding.
12:58:44 Family in patients room.
12:58:45 Patient NPO since Midnight.
12:59:09 Patient allergic to Other allergyPCN
12:59:12 Is the patient allergic to Iodine/contrast media? No.
13:03:00 Vital chart was started
13:09:22 Baseline sample Acquired.
13:09:33 Rhythm: sinus rhythm
13:09:34 Full Disclosure recording started
13:09:38 Is patient on blood thinner?No
13:09:39 Patient diabetic? No.
13:09:41 Previous problem with sedation/anesthesia? No ?
13:09:42 Snore? Yes
13:09:43 Sleep apnea? No
13:09:43 Deviated septum? No
 
13:09:44 Opens mouth fully? Yes
13:09:45 Sticks out tongue? Yes
13:09:46 Airway obstruction? No ?
13:09:48 Dentures? No ?
13:09:52 Pre procedure: right dorsailis pedis pulse 2+ Normal;
easily identifiable; not easily obliterated
13:09:54 Patient pain scale 0/10 ?.
13:10:05 IV patent on arrival in left forearm with 0.9% NaCl at
[facility].
13:10:31 Lab Result : BUN 17 mg/dl
13:10:31 Lab Result : Creatinine 0.9 mg/dl
13:10:31 Lab Result : Hemoglobin 16.5 g/dl
13:10:31 Lab Result : Hematocrit 47.4 %
13:10:33 Lab results completed and on chart.
13:10:35 Right groin area was prepped with chlora-prep and
draped in sterile fashion
13:10:36 Alarms reviewed by R. N.
13:10:36 Sharps counted by scrub and verified by R.N.
13:10:38 Use device set Femoral Dx
13:10:39 ACIST Syringe (98028) opened to sterile field.
13:10:39 Bag Decanter (2002S) opened to sterile field.
13:10:40 Medline Cath Pack ([id number]) opened to sterile field.
13:10:41 ACIST Hand Control (23961) opened to sterile field.
13:10:41 ACIST Manifold (11042) opened to sterile field.
13:10:42 Tegaderm 4 x 4 (1626W) opened to sterile field.
13:10:43 SHEATH 5FR Landers ([id number]) opened to sterile field.
13:10:44 DIAGNOSTIC WIRE .035 260cm J wire (549635) opened to
sterile field.
13:10:45 DIAGNOSTIC Multipack 5Fr catheter set (XH1563) opened
to sterile field.
13:10:47 Oxygen 2 l/min etCO2 Nasal cannula was administered by
Tom Mathew RN; Per physician;
13:10:50 Physician arrived
13:10:51 --------ALL STOP TIME OUT------
13:10:51 Final Timeout: patient, procedure, and site verified
with staff and physician. All members of the team are
in agreement.
13:10:52 Right groin site verified by team.
13:10:54 Maximum allowable Isovue 300 dose 300ml. Physician
notified. (300ml for normal creatinines. For patients
with creatinine of 1.7 or higher multiply weight(kg) x
5 divided by creatinine.)
13:10:55 Heparin Flush Bag (1000units/500ml NS) 2 bags added to
field was administered by Tom Mathew RN; used for
procedure;
13:11:03 Fire Safety Assessment: A--An alcohol-based skin
anteseptic being used preoperatively., C--Open oxygen
or nitrous oxide is being used., D--An ESU, laser, or
fiber-optic light is being used.
13:11:04 0.9% NaCl 100 ml/hr I.V. was administered by Tom Mathew RN; Per physician;
13:11:05 Physical assessment completed. ASA score P 2 - A
patient with mild systemic disease as per Cornell Sher MD.
13:11:11 Sedation plan: IV Moderate Sedation Medication:Versed,
Fentanyl
13:11:13 Lidocaine 2% 20ml vial added to field was administered
by Tom Mathew RN; for local anesthetic;
13:11:22 Fentanyl 50 mcg I.V. was administered by Tom Mathew
RN; for sedation;
 
13:11:29 Versed 1 mg I.V. was administered by Tom Mathew RN;
for sedation;
13:17:06 Fentanyl 50 mcg I.V. was administered by Tom Mathew
RN; for sedation;
13:17:06 Zero performed for pressure channel P1
13:17:10 Versed 1 mg I.V. was administered by Tom Mathew RN;
for sedation;
13:18:11 Procedure started.
13:18:14 Local anesthetic to right femoral artery with
Lidocaine 2% by Cornell Sher MD.**INITIAL ACCESS
ONLY**
13:18:22 A 5 Fr sheath was inserted into the Right Femoral
artery
13:18:37 A MULTIPACK JL 4.0 5Fr catheter was advanced over the
wire and used for Procedure.
13:19:47 LCA angiography performed.
13:21:26 Catheter exchanged over wire.
13:21:32 A MULTIPACK 3DRC 5Fr catheter was advanced over the
wire and used for Procedure.
13:21:35 RCA angiography performed.
13:22:09 SVG to Diag angiography performed.
13:23:39 SHEATH 6FR Destination (RSR01) opened to sterile
field.
13:23:45 Catheter removed.
13:23:59 Sheath upsized to a 6 Fr Mid-Length.
13:25:09 A DIAGNOSTIC JL 5 5Fr catheter ([id number]) was advanced
over the wire and used for Procedure.
13:26:25 Catheter removed. unable to cannulate vessel.
13:26:35 A MULTIPACK JL 4.0 5Fr catheter was advanced over the
wire and used for Procedure.
13:27:52 Catheter removed. unable to cannulate vessel.
13:28:38 GUIDE 6FR EBU 3.5 catheter ([id number]) opened to
sterile field.
13:28:48 6 Fr EBU 3.5 guide catheter was inserted over the wire
13:31:22 GUIDE 6FR EBU 4.5 catheter ([id number]) opened to
sterile field.
13:31:30 Guide Catheter removed. unable to cannulate vessel.
13:36:08 GUIDE 6FR AR 2.0 catheter (GI8BD34) opened to sterile
field.
13:36:19 SVG to Circ angiography performed.
13:36:22 SVG to RCA angiography performed.
13:36:35 Catheter exchanged over wire.
13:36:40 A MULTIPACK 3DRC 5Fr catheter was advanced over the
wire and used for Procedure.
13:39:10 LIMA to LAD angiography performed.
13:40:59 A DIAGNOSTIC JL 5 5Fr catheter ([id number]) was advanced
over the wire and used for Procedure.
13:41:09 LCA angiography performed.
13:41:13 Catheter exchanged over wire.
13:41:18 A MULTIPACK Pigtail 5 Fr catheter was advanced over
the wire and used for Procedure.
13:41:20 SHEATH 6FR Landers (GJW139) opened to sterile field.
13:41:25 EXOSEAL 6Fr () opened to sterile field.
13:42:20 LV gram done using MIRANDA
13:42:22 Injector settings: Ml/sec: 10, Volume: 20,
13:42:24 LV hemodynamics recorded.
13:42:28 EF : 55 %
13:42:48 Catheter removed.
13:43:02 Sheath upsized to a 6 Fr Short.
13:43:09 Sheath removed intact; hemostasis achieved with
 
Exoseal to the Right Femoral artery.
13:43:11 Procedure ended.(Physican Out)
13:43:52 Fluoroscopy time 09.90 minutes.
13:43:57 Flurop Dose total: 1080
13:43:57 Fluoroscopy dose: 1080 mGy
13:44:01 Contrast amount:Isovue 370 188ml.
13:44:03 Sharps counted by scrub and verified by R.N.
13:44:04 Insertion/operative site no bleeding no hematoma.
13:44:06 Post-op/insertion site Right Femoral artery dressed
using a 4 x 4 and Tegaderm.
13:44:09 Post right femoral artery:stable, soft, clean and dry
13:44:10 Post Procedure Pulses reassessed and unchanged
13:44:12 Post-procedure physical assessment completed. ASA
score P 2 - A patient with mild systemic disease as
per Cornell Sher MD.
13:44:14 Post procedure rhythm: unchanged.
13:44:16 Estimated blood loss: 5 ml
13:44:17 Post procedure instruction explained to
patient.Patient verbalizes understanding.
13:44:18 Patient needs reinforcement of post procedure
teaching.
13:45:57 Procedure type changed to Cath procedure, Diagnostic
procedure, LHC, LHC w/Coronaries w/Grafts, Sedation
Charges, Moderate Sedation up to 30 minutes
13:46:20 Procedure and supply charges have been captured,
reviewed, submitted and are correct.
13:46:22 Procedure Complication : No complications
13:46:24 Vital chart was stopped
13:46:24 See physician's report for complete and final results.
13:46:27 Report given to PCU.
13:46:29 Patient transfered to PCU with Stretcher.
13:46:30 Procedure ended.
13:46:30 Full Disclosure recording stopped
13:46:34 End room use (Document Last)
Device Usage
Item Name   Manufacture  Quantity  Catalog    Hospital Part    Current Minimal L
ot# /
Number     Charge   Number  Stock   Stock   Serial#
Code
ACIST       Acist        1         16492      435171   616709  967061  20
Syringe     Medical
(82621)     Systems Inc
Bag         Microtek     1         S      130642   46586   784891  5
Decanter    Medical Inc.
()
Medline     Medline      1         [id number]  926040   03624   975092  5
Cath Pack
([id number])
ACIST Hand  Acist        1         03654      137774   251930  415192  5
Control     Medical
(14007)     Systems Inc
ACIST       Acist        1         81960      655341   702487  734875  5
Manifold    Medical
(54137)     Systems Inc
Tegaderm 4  3M           1         1626W      221555   373179  423765  5
x 4 (1626W)
SHEATH 5FR  Terumo       1         KIE503     261002   883121  342843  5
Landers
(HKO665)
DIAGNOSTIC  [facility]      1         230197     936224   526455  429819  30
 
WIRE .035
260cm J
wire
(499145)
DIAGNOSTIC  Cardinal     1         NK8895     754676   79358   748358  30
[facility]
5Fr
catheter
set
(GK1847)
MULTIPACK   Cardinal     1                                     002259  5
JL 4.0 5Fr  Health
catheter
MULTIPACK   Cardinal     1                                     263425  5
3DRC 5Fr    Health
catheter
SHEATH 6FR  Terumo       1         RSR01      003861   42028   607899  5
Destination
(RSR01)
DIAGNOSTIC  Cardinal     1         812656A    327489   159050  114548  5
JL 5 5Fr    Health
catheter
(517715I)
GUIDE 6FR   Medtronic    1         [id number]   129254   74806   825075  3
EBU 3.5
catheter
(DK0ZVX17)
GUIDE 6FR   Medtronic    1         [id number]   022850   37276   853796  0
EBU 4.5
catheter
(BQ4IJM80)
GUIDE 6FR   Medtronic    1         PQ4ZA29    008481   00375   869926  1
AR 2.0
catheter
(GF2YJ45)
MULTIPACK   Cardinal     1                                     126444  5
Pigtail 5   Health
Fr catheter
SHEATH 6FR  Terumo       1         FSK666     712280   030026  742924  40
Landers
(STY017)
EXOSEAL 6Fr Cardinal     1               544811   210935  137875  10
()     Health
Signature Audit [place]
Stage           Time        Signature      Unsigned
Intra-Procedure 2019    Jarad Kraft
1:49:03 PM  RT(R)
Signatures
Monitor : Jarad Kraft RT Signature :
______________________________
Date : ______________ Time :
______________
 
 
 
[facility]                                          
[address]                           
[place] [address]

## 2019-06-08 NOTE — NUR
EVENING ROUNDS COMPLETED. REPORT RECEIVED. PT SITTING UP IN BED WITH EYES
OPEN, RR EVEN AND UNLABORED. BED IN LOW POSITION. NO S/S OF DISTRESS NOTED.
INTRODUCED SELF TO PT AND WIFE. PT DENIES FURTHER NEEDS AT THIS TIME. CALL
LIGHT IN REACH. WILL CTM.

## 2019-06-08 NOTE — NUR
PATIENT ARRIVED TO THE FLOOR BY WHEELCHAIR. HE IS ALERT AN ORIENTED AND
REPORTED CHEST PAIN WHEN HE CAME TO THE ER . THE CHEST PAIN, STARTED
YESTERDAY. PATIENT REPORTS HE HAS HAD 5 HEART CATHS, AND BIPASS SURGERY HERE
FIVE YEARS AGO. HE IS IN A GOWN, AND DENIES ANY CHEST PAIN AT THIS TIME.

## 2019-06-09 VITALS — SYSTOLIC BLOOD PRESSURE: 130 MMHG | DIASTOLIC BLOOD PRESSURE: 69 MMHG

## 2019-06-09 VITALS — SYSTOLIC BLOOD PRESSURE: 100 MMHG | DIASTOLIC BLOOD PRESSURE: 47 MMHG

## 2019-06-09 VITALS — SYSTOLIC BLOOD PRESSURE: 129 MMHG | DIASTOLIC BLOOD PRESSURE: 74 MMHG

## 2019-06-09 VITALS — DIASTOLIC BLOOD PRESSURE: 67 MMHG | SYSTOLIC BLOOD PRESSURE: 119 MMHG

## 2019-06-09 VITALS — DIASTOLIC BLOOD PRESSURE: 51 MMHG | SYSTOLIC BLOOD PRESSURE: 117 MMHG

## 2019-06-09 LAB
ALBUMIN SERPL-MCNC: 3.5 G/DL (ref 3.4–5)
ALP SERPL-CCNC: 62 U/L (ref 46–116)
ALT SERPL-CCNC: 30 U/L (ref 10–68)
ANION GAP SERPL CALC-SCNC: 11.4 MMOL/L (ref 8–16)
BASOPHILS NFR BLD AUTO: 0.2 % (ref 0–2)
BILIRUB SERPL-MCNC: 0.42 MG/DL (ref 0.2–1.3)
BUN SERPL-MCNC: 17 MG/DL (ref 7–18)
CALCIUM SERPL-MCNC: 9 MG/DL (ref 8.5–10.1)
CHLORIDE SERPL-SCNC: 105 MMOL/L (ref 98–107)
CK MB SERPL-MCNC: 1.1 U/L (ref 0–3.6)
CK SERPL-CCNC: 23 UL (ref 21–232)
CO2 SERPL-SCNC: 26.7 MMOL/L (ref 21–32)
CREAT SERPL-MCNC: 0.9 MG/DL (ref 0.6–1.3)
EOSINOPHIL NFR BLD: 2 % (ref 0–7)
ERYTHROCYTE [DISTWIDTH] IN BLOOD BY AUTOMATED COUNT: 13.8 % (ref 11.5–14.5)
GLOBULIN SER-MCNC: 2.9 G/L
GLUCOSE SERPL-MCNC: 127 MG/DL (ref 74–106)
HCT VFR BLD CALC: 47.4 % (ref 42–54)
HGB BLD-MCNC: 16.5 G/DL (ref 13.5–17.5)
IMM GRANULOCYTES NFR BLD: 1.4 % (ref 0–5)
LYMPHOCYTES NFR BLD AUTO: 18.4 % (ref 15–50)
MCH RBC QN AUTO: 30.5 PG (ref 26–34)
MCHC RBC AUTO-ENTMCNC: 34.8 G/DL (ref 31–37)
MCV RBC: 87.6 FL (ref 80–100)
MONOCYTES NFR BLD: 10.3 % (ref 2–11)
NEUTROPHILS NFR BLD AUTO: 67.7 % (ref 40–80)
OSMOLALITY SERPL CALC.SUM OF ELEC: 281 MOSM/KG (ref 275–300)
PLATELET # BLD: 241 10X3/UL (ref 130–400)
PMV BLD AUTO: 11 FL (ref 7.4–10.4)
POTASSIUM SERPL-SCNC: 4.1 MMOL/L (ref 3.5–5.1)
PROT SERPL-MCNC: 6.4 G/DL (ref 6.4–8.2)
RBC # BLD AUTO: 5.41 10X6/UL (ref 4.2–6.1)
SODIUM SERPL-SCNC: 139 MMOL/L (ref 136–145)
TROPONIN I SERPL-MCNC: < 0.017 NG/ML (ref 0–0.06)
WBC # BLD AUTO: 9.8 10X3/UL (ref 4.8–10.8)

## 2019-06-09 NOTE — NUR
20 GAUGE IV REMOVED FROM RIGHT WRIST. CATHETER TIP INTACT. NO BLEEDING FROM
SITE. 2X2 GAUZE APPLIED AND SECURED WITH BANDAID. TOELRATED IV REMOVAL WELL
TELEMETRY REMOVED AND RETURNED TO MONITOR STATION AT THIS TIME.

## 2019-06-09 NOTE — NUR
RECEIVED BACK FROM THE CATH LAB VIA BED. RIGHT GROIN DRESSING IS C/D/I. PPP
AND STRONG. TO LAY FLAT X 3 HOURS.

## 2019-06-09 NOTE — NUR
RECEIVED REPORT. ASSUMED CARE OF PATIENT. PATIENT OOB SITTING TO CHAIR AT
BEDSIDE. PATIENT STATES HE HAS HAD NO FURTHER CHEST PAIN AND HOPES HE GETS TO
GO HOME TODAY. PATIENT VERY PLEASANT AND TALKATIVE. DENIES NEEDS AT THIS TIME.
NO DISTRESS.

## 2019-06-09 NOTE — NUR
DISCHARGE INSTRUCTIONS PROVIDED TO PATIENT AND HIS WIFE. PATIENT VERBALIZED
UNDERSTANDING OF ALL INSTRUCTIONS PROVIDED AND THE IMPORTANCE OF FOLLOWING
INSTRUCTIONS FOR HEART CATH THAT PATIENT RECEIVED TODAY.

## 2019-06-09 NOTE — NUR
PATIENT LEFT UNIT VIA WHEELCHAIR WITH ALL PERSONAL BELONGINGS. PATIENT
DISCHARGED TO HOME WITH HIS WIFE BY PERSONAL CAR. PATIENT LEFT UNIT IN NO
DISTRESS.

## 2019-06-09 NOTE — NUR
MESSAGE SENT FOR FINGER FOODS AT THIS TIME WHILE PATIENT IS LYING FLAT. FAMILY
AT BEDSIDE. PERIPHERAL PULSES PATENT, NO S/S HEMATOMA.

## 2019-06-10 NOTE — MORECARE
CASE MANAGEMENT DISCHARGE SUMMARY
 
 
PATIENT: KUMAR YOUNG                   UNIT: [id number]
ACCOUNT#: [id number]                       ADM DATE: 19
AGE: 84     : [birth year]  SEX: M            ROOM/BED: D.2137    
AUTHOR: BINH POLLARD                             PHYSICIAN:                               
 
REFERRING PHYSICIAN: LAUREL BABIN MD               
DATE OF SERVICE: 06/10/19
Discharge Plan
 
 
Patient Name: KUMAR YOUNG
[facility]
Encounter #: [id number]
Medical Record #: [id number]
: 1935
Planned Disposition: Home
Anticipated Discharge Date: 19
 
Discharge Date: 2019
Expected LOS: 1
Initial Reviewer: [email or web address]
Initial Review Date: 06/10/2019
Generated: 6/10/19  10:35 am 
  
 
 
 
 
 
 
 
Patient Name: KUMAR YOUNG
 
Encounter #: [id number]
Page [phone]
 
 
 
 
 
Electronically Signed by BINH POLLARD on 06/10/19 at 0936
 
 
 
 
 
 
**All edits/amendments must be made on the electronic document**
 
DICTATION DATE: 06/10/19 0935     : RODRIGUEZ  06/10/19 0935     
RPT#: [id number]                                DC DATE:19
                                               STATUS: DIS IN  
[facility]
[address] [place] [address]
***END OF REPORT***

## 2019-06-10 NOTE — CN
PATIENT NAME:KUMAR YOUNG                             MEDICAL RECORD: [id number]
: 37                                              LOCATION:. D.2137
ADMIT DATE: 19                                       ACCOUNT: [id number]
CONSULTING PHYSICIAN:    MARLON MOORE MD          
                                               
REFERRING PHYSICIAN:     LAUREL BABIN MD               
 
 
DATE OF CONSULTATION:  2019
 
HISTORY OF PRESENT ILLNESS:  An 84-year-old gentleman with known history of
coronary artery disease, status post coronary artery bypass grafting more
recently.  He had intervention via Dr. Younger.  At that point in time, he was
noted to have patent LIMA to LAD.  He had one intervention at distal to the vein
graft to the circumflex.  Vein graft to the right was patent at that time as
well.  LV function was normal.  Onset of chest tightness and pressure with
exertion yesterday, consistent with angina.  We are asked to see him concerning
his cardiovascular status.
 
PAST MEDICAL HISTORY:  Includes;
1.  History of hypertension.
2.  Hyperlipidemia.
 
ALLERGIES:  PENICILLIN.
 
MEDICATIONS:  Include Mevacor 40 daily and aspirin 81 daily.
 
SOCIAL HISTORY:  .  Nonsmoker and nondrinker.  He exercises on regular
basis.  He easily takes care of all his ADLs.
 
REVIEW OF SYSTEMS:  The patient reports easy bruising but reports no swollen
glands. The patient reports no fever, no night sweats, no significant weight
gain, no significant weight loss.  No significant exercise tolerance.  The
patient reports no dry eyes, no irritation, no vision change.  Patient reports
no difficulty hearing and no ear pain.  Patient reports no frequent nose bleeds
or nose and sinus problems.  Patient reports on arm pain on exertion.  No
shortness of breath while lying down.  No history of heart murmur.  Patient
reports no cough, no wheezing or coughing up blood.  Patient reports no
abdominal pain, no vomiting.  Normal appetite.  No diarrhea and not vomiting
blood.  No nausea and no constipation.  Patient reports no incontinence.  No
difficulty urinating.  No hematuria.  No increased frequency.  Patient reports
no muscle aches.  No weakness, no arthralgias, no back pain.  No swelling of the
extremities.  Patient reports no abnormal mole, no jaundice, no rashes.  Reports
no loss of consciousness.  No weakness and no numbness.  No seizures, dizziness,
or headaches.  The patient reports no depression, no sleep disturbance, feeling
safe in a relationship and no alcohol abuse.  Patient reports on fatigue. 
Reports no runny nose or sinus pressure.  No itching, no hives, and no frequent
sneezing.
 
PHYSICAL EXAMINATION:
GENERAL:  Pleasant gentleman, in no acute distress, appears stated age.
VITAL SIGNS:  Blood pressure 129/74.  Pulse 60 and regular.
HEENT:  Normocephalic and atraumatic.
NECK:  No JVD or bruit.
HEART:  Regular.  S4 gallop is noted.
LUNGS:  Good air excursion.
ABDOMEN:  Soft and nontender.
EXTREMITIES:  Pulses 2+ with no edema.
 
 
 
CONSULT REPORT                                 [id number]    KUMAR YOUNG           
 
 
 
IMPRESSION:  Acute coronary syndrome.  Angiography and intervention based on
above.
 
TRANSINT:[id number] Voice Confirmation ID: [id number] DOCUMENT ID: [id number]
                                           
                                           MARLON MOORE MD          
 
 
 
Electronically Signed by MARLON MOORE on 06/10/19 at 0955
 
 
 
 
 
 
 
 
 
 
 
 
 
 
 
 
 
 
 
 
 
 
 
 
 
 
 
 
 
 
 
 
 
 
 
 
CC:                                                             [id number]
DICTATION DATE: 19 1202     :     19 1428      DIS IN  
                                                                      19
[id number] [place] [address]

## 2019-06-10 NOTE — OP
PATIENT NAME:  KUMAR YOUNG                         MEDICAL RECORD: [id number]
:37                                             LOCATION:D.M2     D.2137
                                                         ADMISSION DATE:19
SURGEON:  MARLON MOORE MD          
 
 
DATE OF OPERATION:  2019
 
PROCEDURE:  Left heart catheterization, selective coronary angiography, right
femoral artery approach.
 
CATHETERS:  We had to exchange to a long 6-[place] sheath due to tortuosity of
the iliacs and the femoral itself for diagnostic purposes.  Catheters included
AR and JL5.
 
FINDINGS:  Left ventriculography in 30-degree MIRANDA view:  Normal wall motion and
normal systolic function.
 
CORONARY ANATOMY:
LEFT MAIN:  Left main is free of disease.
LAD:  Totally occluded.  It fills via LIMA to LAD.
CIRCUMFLEX:  It shows a retrofilling graft with patent stent past the graft.
RIGHT CORONARY ARTERY:  Totally occluded proximally.
 
BYPASS GRAFTS:
1.  LIMA to LAD is widely patent without evidence of postanastomotic stenosis.
2.  Saphenous vein graft to the circumflex distally where stent is widely
patent.  Graft itself is healthy.
3.  Saphenous vein graft to the right PDA and PL is widely patent.
 
IMPRESSION:  Widely patent grafts.  Widely patent stents.  Normal LV function.
 
TRANSINT:[id number] Voice Confirmation ID: [id number] DOCUMENT ID: [id number]
                                           
                                           MARLON MOORE MD          
 
 
 
Electronically Signed by MARLON MOORE on 06/10/19 at 0955
 
 
 
 
 
 
 
 
 
 
 
CC:                                                             [id number]
DICTATION DATE: 19 1355     :     19 1523      DIS IN  
                                                                      19
[facility]                                          
[address] [place] [address]

## 2021-04-18 ENCOUNTER — HOSPITAL ENCOUNTER (INPATIENT)
Dept: HOSPITAL 84 - D.ER | Age: 86
LOS: 3 days | Discharge: HOME | DRG: 243 | End: 2021-04-21
Attending: FAMILY MEDICINE | Admitting: FAMILY MEDICINE
Payer: MEDICARE

## 2021-04-18 VITALS
BODY MASS INDEX: 25.25 KG/M2 | WEIGHT: 180.38 LBS | BODY MASS INDEX: 25.25 KG/M2 | WEIGHT: 180.38 LBS | BODY MASS INDEX: 25.25 KG/M2 | HEIGHT: 71 IN | HEIGHT: 71 IN

## 2021-04-18 VITALS — DIASTOLIC BLOOD PRESSURE: 57 MMHG | SYSTOLIC BLOOD PRESSURE: 112 MMHG

## 2021-04-18 VITALS — DIASTOLIC BLOOD PRESSURE: 50 MMHG | SYSTOLIC BLOOD PRESSURE: 131 MMHG

## 2021-04-18 VITALS — DIASTOLIC BLOOD PRESSURE: 57 MMHG | SYSTOLIC BLOOD PRESSURE: 121 MMHG

## 2021-04-18 VITALS — SYSTOLIC BLOOD PRESSURE: 138 MMHG | DIASTOLIC BLOOD PRESSURE: 57 MMHG

## 2021-04-18 VITALS — SYSTOLIC BLOOD PRESSURE: 128 MMHG | DIASTOLIC BLOOD PRESSURE: 58 MMHG

## 2021-04-18 VITALS — SYSTOLIC BLOOD PRESSURE: 113 MMHG | DIASTOLIC BLOOD PRESSURE: 55 MMHG

## 2021-04-18 VITALS — SYSTOLIC BLOOD PRESSURE: 108 MMHG | DIASTOLIC BLOOD PRESSURE: 66 MMHG

## 2021-04-18 DIAGNOSIS — G89.29: ICD-10-CM

## 2021-04-18 DIAGNOSIS — I49.5: Primary | ICD-10-CM

## 2021-04-18 DIAGNOSIS — I10: ICD-10-CM

## 2021-04-18 DIAGNOSIS — M54.9: ICD-10-CM

## 2021-04-18 DIAGNOSIS — E78.5: ICD-10-CM

## 2021-04-18 DIAGNOSIS — I44.1: ICD-10-CM

## 2021-04-18 DIAGNOSIS — I25.110: ICD-10-CM

## 2021-04-18 DIAGNOSIS — Z85.72: ICD-10-CM

## 2021-04-18 LAB
ALBUMIN SERPL-MCNC: 3.9 G/DL (ref 3.4–5)
ALP SERPL-CCNC: 86 U/L (ref 30–120)
ALT SERPL-CCNC: 20 U/L (ref 10–68)
ANION GAP SERPL CALC-SCNC: 12 MMOL/L (ref 8–16)
APTT BLD: 34.9 SECONDS (ref 22.8–39.4)
BASOPHILS NFR BLD AUTO: 0.6 % (ref 0–2)
BILIRUB SERPL-MCNC: 0.59 MG/DL (ref 0.2–1.3)
BUN SERPL-MCNC: 15 MG/DL (ref 7–18)
CALCIUM SERPL-MCNC: 9.2 MG/DL (ref 8.5–10.1)
CHLORIDE SERPL-SCNC: 104 MMOL/L (ref 98–107)
CK MB SERPL-MCNC: 1.9 U/L (ref 0–3.6)
CK MB SERPL-MCNC: 2.4 U/L (ref 0–3.6)
CK SERPL-CCNC: 56 UL (ref 21–232)
CK SERPL-CCNC: 69 UL (ref 21–232)
CO2 SERPL-SCNC: 28.9 MMOL/L (ref 21–32)
CREAT SERPL-MCNC: 1.1 MG/DL (ref 0.6–1.3)
EOSINOPHIL NFR BLD: 2.8 % (ref 0–7)
ERYTHROCYTE [DISTWIDTH] IN BLOOD BY AUTOMATED COUNT: 13.2 % (ref 11.5–14.5)
GLOBULIN SER-MCNC: 3.2 G/L
GLUCOSE SERPL-MCNC: 80 MG/DL (ref 74–106)
HCT VFR BLD CALC: 50.3 % (ref 42–54)
HGB BLD-MCNC: 17 G/DL (ref 13.5–17.5)
IMM GRANULOCYTES NFR BLD: 0.3 % (ref 0–5)
INR PPP: 1.21 (ref 0.85–1.17)
LYMPHOCYTES # BLD: 2.62 10X3/UL (ref 1.32–3.57)
LYMPHOCYTES NFR BLD AUTO: 30 % (ref 15–50)
MAGNESIUM SERPL-MCNC: 2.1 MG/DL (ref 1.8–2.4)
MCH RBC QN AUTO: 31.5 PG (ref 26–34)
MCHC RBC AUTO-ENTMCNC: 33.8 G/DL (ref 31–37)
MCV RBC: 93.3 FL (ref 80–100)
MONOCYTES NFR BLD: 11.1 % (ref 2–11)
NEUTROPHILS # BLD: 4.81 10X3/UL (ref 1.78–5.38)
NEUTROPHILS NFR BLD AUTO: 55.2 % (ref 40–80)
OSMOLALITY SERPL CALC.SUM OF ELEC: 280 MOSM/KG (ref 275–300)
PLATELET # BLD: 238 10X3/UL (ref 130–400)
PMV BLD AUTO: 11 FL (ref 7.4–10.4)
POTASSIUM SERPL-SCNC: 3.9 MMOL/L (ref 3.5–5.1)
PROT SERPL-MCNC: 7.1 G/DL (ref 6.4–8.2)
PROTHROMBIN TIME: 14.2 SECONDS (ref 11.6–15)
RBC # BLD AUTO: 5.39 10X6/UL (ref 4.2–6.1)
SODIUM SERPL-SCNC: 141 MMOL/L (ref 136–145)
TROPONIN I SERPL-MCNC: < 0.017 NG/ML (ref 0–0.06)
TROPONIN I SERPL-MCNC: < 0.017 NG/ML (ref 0–0.06)
WBC # BLD AUTO: 8.7 10X3/UL (ref 4.8–10.8)

## 2021-04-18 NOTE — NUR
RECEIVED REPORT, WILL ASSUME CARE OF PT, DENIES ANY NEEDS AT THIS TIME, WIFE
AT BEDSIDE, BED IS LOW, SRX2, CALL LIGHT IN REACH, WILL CONTINUE PLAN OF CARE

## 2021-04-18 NOTE — HEMODYNAMI
PATIENT:KUMAR YOUNG                                MEDICAL RECORD: [id number]
: 37                                            LOCATION:[facility]     D.2125
[id number]                                       ADMISSION DATE: 21
 
 
 Generatedon:111:24
Patient name: KUMAR YOUNG Patient #: [id number] Visit #: [id number] SSN: YOB: 1935 Date
of study: 2021
Page: Of
Hemodynamic Procedure Report
****************************
Patient Data
Patient Demographics
Procedure consent was obtained
First Name: KUMAR         Gender: Male
Last Name: HANNAH          : 1935
Middle Initial: C           Age: 86 year(s)
Patient #: [id number]       Race: Unknown
Visit #: [id number]
Accession #:
[id number]
Additional ID: [id number]
Contact details
Address: [address] [place] [address]     Phone: [phone]
State: [place]
City: [place]
Zip code: [address]
Past Medical History
Allergies
Allergen        Reaction        Date         Comments
Reported
Penicillins                     2018
Other allergy                   2018    PCN
Other allergy                   2019     PCN
Penicillins                     2021
Admission
Admission Data
Admission Date: 2021   Admission Time: 15:50
Room #: D.2125
Lab Results
Lab Result Date: 2021  Lab Result Time: 0:00
Biochemistry
Name         Units    Result                Min      Max
BUN          mg/dl    14       --(--*-)--   7        18
Creatinine   mg/dl    0.9      --(-*--)--   0.6      1.3
eGFR         ml/min   85.83439 -*(----)--   90       120
NONAFRICAN
CBC
Name         Units    Result                Min      Max
Hematocrit   %        46.1     --(-*--)--   42       54
Hemoglobin   g/dl     15.7     --(--*-)--   13.5     17.5
Procedure
Procedure Types
Cath Procedure
Diagnostic Procedure
LHC
LHC w/Coronaries w/Grafts
Sedation Charges
 
Moderate Sedation 10-24 minutes
Procedure Description
Procedure Date
Procedure Date: 2021
Procedure Start Time: 11:04
Procedure End Time: 11:22
Procedure Staff
Name                            Function
Juan Pablo Jang MD                   Performing Physician
Irma Beverly RT               Monitor
Rl Foote RN              Nurse
Brenda Bowling RT               Scrub
Procedure Data
Cath Procedure
Fluoroscopy
Diagnostic fluoroscopy      Total fluoroscopy Time: 4.5
time: 4.5 min               min
Diagnostic fluoroscopy      Total fluoroscopy dose: 574
dose: 574 mGy               mGy
Contrast Material
Contrast Material Type                       Amount (ml)
Isovue 300                                   79
Entry Location
Entry     Primary  Successful  Side  Size (Fr)  Upsize Upsize Entry    Closure S
uccessful  Closure
Location                                        1 (Fr) 2 (Fr) Remarks  Device   
           Remarks
Femoral                        Right 6 Fr       6 Fr                   Exoseal
artery                               Mid-Length Short
Estimated blood loss: 10 ml
Diagnostic catheters
Device Type               Used For           End Catheter
Placement
DIAGNOSTIC JL 5 5Fr       Procedure
catheter (112238Z)
DIAGNOSTIC AR2 MOD 5 Fr   Procedure
catheter (850054R)
DIAGNOSTIC IM 5Fr         Procedure
catheter (907912M)
DIAGNOSTIC Pigtail 5Fr    Ventriculography
catheter (445247O)
Procedure Complications
No complications
Procedure Medications
Medication           Administration Route Dosage
0.9% NaCl            I.V.                 100 ml/hr
Oxygen               etCO2 Nasal cannula  2 l/min
Heparin Flush Bag    added to field       2 bags
(1000units/500ml NS)
Lidocaine 2%         added to field       20
Versed               I.V.                 1 mg
Fentanyl             I.V.                 50 mcg
Hemodynamics
Rest
HGB: 15.7 (g/dl) Heart Rate: 49 (bpm)
Pressure Samples
Time     Site     Value (mmHg) Purpose      Heart      Use
Rate(bpm)
11:14    LV       115/5,15     Snapshot     64
Gradients
 
Valve  Time  Site Site Mean    SEP/DFP    Peak To Heart  Use
1    2    (mmHg)  (sec/min)  Peak    Rate
(mmHg)  (bpm)
Aortic 11:15 LV   AO                              59
Snapshots
Pre Cath      Intra         NCS           Post Cath
Vital Signs
Time     Heart  Resp   SPO2 etCO2   NIBP (mmHg) Rhythm  Pain    Sedation
Rate   (ipm)  (%)  (mmHg)                      Status  Level
(bpm)
10:47:32 56     16     96   0       132/71(115) NSR     0 (11)  10(A)
, No
pain
10:51:48 55     32     96   15      129/67(92)  NSR     0 (11)  10(A)
, No
pain
10:56:56 57     36     93   0       130/65(98)  NSR     0 (11)  10(A)
, No
pain
11:01:11 51     40     95   0.7     124/68(99)  NSR     0 (11)  10(A)
, No
pain
11:05:25 52     25     93   17.2    122/66(110) NSR     0 (11)  10(A)
, No
pain
11:09:37 56     18     93   0       127/69(92)  NSR     0 (11)  10(A)
, No
pain
11:13:51 53     32     94   12.7    126/66(105) NSR     0 (11)  10(A)
, No
pain
11:18:50 59     11     96   23.3    132/63(114) NSR     0 (11)  10(A)
, No
pain
Medications
Time     Medication       Route   Dose  Verified Delivered Reason     Notes  Eff
ectiveness
by       by
10:48:44 0.9% NaCl        I.V.    100   Rl  Rl   Per
ml/hr Dary Foote   physician
RN       RN
10:48:55 Oxygen           etCO2   2     Rl  Rl   Per
Nasal   l/min Dary Foote   physician
cannula       RN       RN
10:49:04 Heparin Flush    added   2     Rl  Rl   used for
Bag              to      bags  Dary Foote   procedure
(1000units/500ml field         RN       RN
NS)
10:49:13 Lidocaine 2%     added   20ml  Rl  Rl   for local
to      vial  Lorigan  Cinthiaigan   anesthetic
field         RN       RN
11:00:13 Versed           I.V.    1 mg  Rl  Rl   for
Lorigan  Lorigan   sedation
RN       RN
11:00:20 Fentanyl         I.V.    50    Rl  Rl   for
mcg   Lorigan  Lorigan   sedation
RN       RN
Procedure Log
Time     Note
10:29:00 Informed consent obtained and on chart
 
10:29:33 Procedure Status Urgent Heart Cath (IP).
10:29:35 Time tracking: Regular hours (M-F 7:00 - 5:00)
10:29:41 Plan of Care:Hemodynamics will remain stable., Cardiac
rhythm will remain stable., Comfort level will be
maintained., Respiratory function will remain
adequate., Patient/ family verbilizes understanding of
procedure., Procedure tolerated without complication.,
Recovers from procedure without complications..
10:29:43 Rl Foote RN sent for patient. Start room use.
10:29:49 H&P Date Dictated: 2021 ER History on chart..
10:36:14 Patient allergic to Penicillins
10:36:46 Lab Result : BUN 14 mg/dl
10:36:47 Lab Result : eGFR NONAFRICAN 85.34722 ml/min
10:36:47 Lab Result : Creatinine 0.9 mg/dl
10:36:47 Lab Result : Hematocrit 46.1 %
10:36:47 Lab Result : Hemoglobin 15.7 g/dl
10:36:52 Lab results completed and on chart.
10:37:59 Patient received from Med II to CCL 1 Alert and
oriented. Tansferred to table in Supine position.
10:38:00 Warm blankets applied, and ernesto hugger turned on for
patient comfort.
10:38:00 Correct patient and procedure confirmed by team.
10:38:01 ECG and BP/O2 sat monitors applied to patient.
10:46:33 Vital chart was started
10:46:34 Baseline sample Acquired.
10:46:39 Rhythm: sinus rhythm
10:46:41 Full Disclosure recording started
10:46:48 Pre-procedure instructions explained to patient.
10:46:53 Family in patients room.
10:47:01 Patient NPO since Midnight.
10:47:09 Is the patient allergic to Iodine/contrast media? No.
10:47:11 Was the patient premedicated? Yes
10:47:14 Is patient on blood thinner?Yes
10:47:19 **ACC** The patient was administered the following
blood thiners within the last 24 hours: **ACC**Lovenox
10:47:22 Patient diabetic? No.
10:47:30 Snore? No
10:47:33 Sleep apnea? No
10:47:37 Dentures? No ?
10:47:52 Patient pain scale 0/10 ?.
10:48:00 IV patent on arrival in right antecubital with 0.9%
NaCl at [facility].
10:48:08 Stress Test: no; N/A ?
10:48:12 Right groin area was prepped with chlora-prep and
draped in sterile fashion
10:48:13 Alarms reviewed by R. N.
10:48:14 Sharps counted by scrub and verified by R.N.
10:48:15 Physician arrived
10:48:44 0.9% NaCl 100 ml/hr I.V. was administered by Rl Foote RN; Per physician; Verbal order read back and
verified.
10:48:55 Oxygen 2 l/min etCO2 Nasal cannula was administered by
Rl Foote RN; Per physician; Verbal order read
back and verified.
10:49:04 Heparin Flush Bag (1000units/500ml NS) 2 bags added to
field was administered by Rl Foote RN; used for
procedure; Verbal order read back and verified.
10:49:13 Lidocaine 2% 20ml vial added to field was administered
by Rl Foote RN; for local anesthetic; Verbal
order read back and verified.
 
10:59:50 --------ALL STOP TIME OUT------
10:59:51 Final Timeout: patient, procedure, and site verified
with staff and physician. All members of the team are
in agreement.
10:59:55 Right groin site verified by team.
11:00:00 Fire Safety Assessment: A--An alcohol-based skin
anteseptic being used preoperatively., C--Open oxygen
or nitrous oxide is being used., D--An ESU, laser, or
fiber-optic light is being used.
11:00:04 Physical assessment completed. ASA score P 3 - A
patient with severe systemic disease as per Juan Pablo Jang MD.
11:00:09 2) 60-89 Mildly reduced kidney function, and other
findings (as for stage 1) point to kidney disease.
11:00:13 Versed 1 mg I.V. was administered by Rl Foote
RN; for sedation; Verbal order read back and verified.
11:00:15 Maximum allowable contrast dose (3.7 X eGFR X 0.75)236
ml.
11:00:20 Fentanyl 50 mcg I.V. was administered by Rl Foote RN; for sedation; Verbal order read back and
verified.
11:00:20 Sedation plan: IV Moderate Sedation Medication:Versed,
Fentanyl
11:00:40 Use device set Femoral Dx
11:00:41 ACIST Syringe (40936) opened to sterile field.
11:00:42 Bag Decanter () opened to sterile field.
11:00:44 Medline Cath Pack ([id number]) opened to sterile field.
11:00:46 ACIST Hand Control (22234) opened to sterile field.
11:00:47 ACIST Manifold (89841) opened to sterile field.
11:00:49 Tegaderm 4 x 4 (1626W) opened to sterile field.
11:00:52 EMERALD Guide Wire (557-741) opened to sterile field.
11:04:23 Procedure started.
11:04:27 Local anesthetic to right femoral artery with
Lidocaine 2% by Juan Pablo Jang MD.**INITIAL ACCESS ONLY**
11:05:00 J wire advanced.
11:05:16 SHEATH 6FR Brite Tip 35cm (248945K) opened to sterile
field.
11:05:24 A 6 Fr Mid-Length sheath was inserted into the Right
Femoral artery
11:06:27 A DIAGNOSTIC JL 5 5Fr catheter (401402C) was advanced
over the wire and used for Procedure.
11:07:09 LCA angiography performed.
11:07:51 Catheter removed.
11:07:58 A DIAGNOSTIC AR2 MOD 5 Fr catheter (669670B) was
advanced over the wire and used for Procedure.
11:08:12 RCA angiography performed.
11:08:42 SVG to RCA angiography performed.
11:10:15 SVG to Circ angiography performed.
11:10:40 sequenchal to the cx and diag
11:10:48 Catheter removed.
11:11:39 A DIAGNOSTIC IM 5Fr catheter (445061X) was advanced
over the wire and used for Procedure.
11:12:32 LIMA to LAD angiography performed.
11:12:42 Catheter removed.
11:12:55 A DIAGNOSTIC Pigtail 5Fr catheter (603681G) was
advanced over the wire and used for Ventriculography.
11:13:36 LV angiography performed.
11:13:40 LV gram done using MIRANDA
11:13:53 Zero performed for pressure channel P1
11:14:51 EF : 50 %
 
11:16:09 Catheter removed.
11:16:28 Sheath upsized to a 6 Fr Short.
11:16:54 EXOSEAL 6Fr () opened to sterile field.
11:17:02 SHEATH 6FR [place] (KXW249) opened to sterile field.
11:17:32 Sheath removed intact; hemostasis achieved with
Exoseal to the Right Femoral artery.
11:17:38 Procedure ended.(Physican Out)
11:18:19 Fluoroscopy time 04.50 minutes.
11:18:25 Flurop Dose total: 574
11:18:25 Fluoroscopy dose: 574 mGy
11:18:30 Dose Area Product 47343 mGy/cm.
11:18:34 Contrast amount:Isovue 300 79ml.
11:18:38 Maximum allowable dose exceeded? No.
11:18:40 Sharps counted by scrub and verified by R.N.
11:18:42 Insertion/operative site no bleeding no hematoma.
11:18:45 Post-op/insertion site Right Femoral artery dressed
using a 4 x 4 and Tegaderm.
11:18:51 Post Procedure Pulses reassessed and unchanged
11:19:12 Post-procedure physical assessment completed. ASA
score P 2 - A patient with mild systemic disease as
per Juan Pablo Jang MD.
11:19:16 Post procedure rhythm: sinus rhythm
11:19:21 Estimated blood loss: 10 ml
11:19:23 Post procedure instruction explained to
patient.Patient verbalizes understanding.
11:19:56 Procedure type changed to Cath procedure, Diagnostic
procedure, LHC, LHC w/Coronaries w/Grafts, Sedation
Charges, Moderate Sedation 10-24 minutes
11:20:10 Procedure and supply charges have been captured,
reviewed, submitted and are correct.
11:21:56 Procedure Complication : No complications
11:21:59 Vital chart was stopped
11:22:01 [facility] Findings: mild to moderate CAD (<70%)
11:22:05 Operative report dictated upon procedure completion.
11:22:06 See physician's report for complete and final results.
11:22:07 Report given to Pre/Post Procedure Room.
11:22:11 Patient transfered to Pre/Post Procedure Room with
Bed.
11:22:14 Procedure ended.
11:22:14 Full Disclosure recording stopped
11:22:24 End room use (Document Last)
11:23:46 End room use (Document Last)
11:24:25 End room use (Document Last)
Device Usage
Item Name   Manufacture  Quantity  Catalog    Hospital Part    Current Minimal L
ot# /
Number     Charge   Number  Stock   Stock   Serial#
Code
ACIST       Acist        1         94806      267006   190273  246055  20
Syringe     Medical
(98638)     Systems Inc
Bag         Microtek     1               606330   51257   974798  5
Decanter    Medical Inc.
()
Medline     Medline      1         [id number]  105074   48769   771726  5
Cath Pack
([id number])
ACIST Hand  Acist        1         54556      457489   217825  919833  5
Control     Medical
(09637)     Systems Inc
 
ACIST       Acist        1         65872      332005   176283  860213  5
Manifold    Medical
(75040)     Systems Inc
Tegaderm 4  3M           1         1626W      098316   497542  257747  5
x 4 (1626W)
EMERALD     Cardinal     1         502-455    264569   870275  750721  5
Guide Wire  Health
(502-455)
SHEATH 6FR  Cardinal     1         836777Z    688733   218540  323317  1
Brite Tip   Health
35cm
(039260L)
DIAGNOSTIC  Cardinal     1         103844L    806460   908520  682296  5
JL 5 5Fr    Health
catheter
(656317L)
DIAGNOSTIC  Cardinal     1         688481L    199615   630718  518250  20
AR2 MOD 5   Health
Fr catheter
(122636E)
DIAGNOSTIC  Cardinal     1         168459L    505458   079761  388909  5
IM 5Fr      Health
catheter
(331924C)
DIAGNOSTIC  Cardinal     1         351488B    745775   002844  253706  5
Pigtail 5Fr Health
catheter
(700345B)
EXOSEAL 6Fr Cardinal     1               761688   399992  806757  10
()     Health
SHEATH 6FR  Terumo       1         PJJ133     092205   166092  969929  40
[place]
(YTS642)
Signature Audit Albert
Stage           Time        Signature      Unsigned
Intra-Procedure 2021   Irma Beverly
11:23:46 AM RT(R)
Intra-Procedure 2021   Rl
11:24:25 AM Dary MCGILL
Intra-Procedure 2021   Juan Pablo Jang MD
11:24:52 AM
 
 
 
 
 
 
 
 
 
 
 
 
 
 
[facility]                                          
[address] [place] [address]

## 2021-04-19 VITALS — SYSTOLIC BLOOD PRESSURE: 124 MMHG | DIASTOLIC BLOOD PRESSURE: 57 MMHG

## 2021-04-19 VITALS — SYSTOLIC BLOOD PRESSURE: 125 MMHG | DIASTOLIC BLOOD PRESSURE: 55 MMHG

## 2021-04-19 VITALS — DIASTOLIC BLOOD PRESSURE: 92 MMHG | SYSTOLIC BLOOD PRESSURE: 147 MMHG

## 2021-04-19 VITALS — DIASTOLIC BLOOD PRESSURE: 68 MMHG | SYSTOLIC BLOOD PRESSURE: 118 MMHG

## 2021-04-19 LAB
ALBUMIN SERPL-MCNC: 3.3 G/DL (ref 3.4–5)
ALP SERPL-CCNC: 66 U/L (ref 30–120)
ALT SERPL-CCNC: 15 U/L (ref 10–68)
ANION GAP SERPL CALC-SCNC: 12.6 MMOL/L (ref 8–16)
APTT BLD: 34.7 SECONDS (ref 22.8–39.4)
BASOPHILS NFR BLD AUTO: 0.6 % (ref 0–2)
BILIRUB SERPL-MCNC: 0.77 MG/DL (ref 0.2–1.3)
BUN SERPL-MCNC: 14 MG/DL (ref 7–18)
CALCIUM SERPL-MCNC: 8.9 MG/DL (ref 8.5–10.1)
CHLORIDE SERPL-SCNC: 107 MMOL/L (ref 98–107)
CHOLEST/HDLC SERPL: 4.3 RATIO (ref 2.3–4.9)
CK MB SERPL-MCNC: 1.7 U/L (ref 0–3.6)
CK SERPL-CCNC: 42 UL (ref 21–232)
CO2 SERPL-SCNC: 25.2 MMOL/L (ref 21–32)
CREAT SERPL-MCNC: 0.9 MG/DL (ref 0.6–1.3)
EOSINOPHIL NFR BLD: 3.2 % (ref 0–7)
ERYTHROCYTE [DISTWIDTH] IN BLOOD BY AUTOMATED COUNT: 13.1 % (ref 11.5–14.5)
ERYTHROCYTE [DISTWIDTH] IN BLOOD BY AUTOMATED COUNT: 13.4 % (ref 11.5–14.5)
GLOBULIN SER-MCNC: 2.7 G/L
GLUCOSE SERPL-MCNC: 99 MG/DL (ref 74–106)
HCT VFR BLD CALC: 46.1 % (ref 42–54)
HCT VFR BLD CALC: 46.5 % (ref 42–54)
HDLC SERPL-MCNC: 33 MG/DL (ref 32–96)
HGB BLD-MCNC: 15.5 G/DL (ref 13.5–17.5)
HGB BLD-MCNC: 15.7 G/DL (ref 13.5–17.5)
IMM GRANULOCYTES NFR BLD: 0.6 % (ref 0–5)
INR PPP: 1.21 (ref 0.85–1.17)
LDL-HDL RATIO: 2.4 RATIO (ref 1.5–3.5)
LDLC SERPL-MCNC: 78 MG/DL (ref 0–100)
LYMPHOCYTES # BLD: 1.92 10X3/UL (ref 1.32–3.57)
LYMPHOCYTES NFR BLD AUTO: 26.8 % (ref 15–50)
MAGNESIUM SERPL-MCNC: 1.9 MG/DL (ref 1.8–2.4)
MCH RBC QN AUTO: 31.1 PG (ref 26–34)
MCH RBC QN AUTO: 31.3 PG (ref 26–34)
MCHC RBC AUTO-ENTMCNC: 33.3 G/DL (ref 31–37)
MCHC RBC AUTO-ENTMCNC: 34.1 G/DL (ref 31–37)
MCV RBC: 91.8 FL (ref 80–100)
MCV RBC: 93.2 FL (ref 80–100)
MONOCYTES NFR BLD: 10.9 % (ref 2–11)
NEUTROPHILS # BLD: 4.15 10X3/UL (ref 1.78–5.38)
NEUTROPHILS NFR BLD AUTO: 57.9 % (ref 40–80)
OSMOLALITY SERPL CALC.SUM OF ELEC: 281 MOSM/KG (ref 275–300)
PHOSPHATE SERPL-MCNC: 3.2 MG/DL (ref 2.5–4.9)
PLATELET # BLD: 206 10X3/UL (ref 130–400)
PLATELET # BLD: 228 10X3/UL (ref 130–400)
PMV BLD AUTO: 10.6 FL (ref 7.4–10.4)
PMV BLD AUTO: 11.3 FL (ref 7.4–10.4)
POTASSIUM SERPL-SCNC: 3.8 MMOL/L (ref 3.5–5.1)
PROT SERPL-MCNC: 6 G/DL (ref 6.4–8.2)
PROTHROMBIN TIME: 14.1 SECONDS (ref 11.6–15)
RBC # BLD AUTO: 4.99 10X6/UL (ref 4.2–6.1)
RBC # BLD AUTO: 5.02 10X6/UL (ref 4.2–6.1)
SODIUM SERPL-SCNC: 141 MMOL/L (ref 136–145)
TRIGL SERPL-MCNC: 158 MG/DL (ref 30–200)
TROPONIN I SERPL-MCNC: < 0.017 NG/ML (ref 0–0.06)
WBC # BLD AUTO: 7.1 10X3/UL (ref 4.8–10.8)
WBC # BLD AUTO: 7.2 10X3/UL (ref 4.8–10.8)

## 2021-04-19 PROCEDURE — 4A023N7 MEASUREMENT OF CARDIAC SAMPLING AND PRESSURE, LEFT HEART, PERCUTANEOUS APPROACH: ICD-10-PCS | Performed by: INTERNAL MEDICINE

## 2021-04-19 PROCEDURE — B2151ZZ FLUOROSCOPY OF LEFT HEART USING LOW OSMOLAR CONTRAST: ICD-10-PCS | Performed by: INTERNAL MEDICINE

## 2021-04-19 PROCEDURE — B2131ZZ FLUOROSCOPY OF MULTIPLE CORONARY ARTERY BYPASS GRAFTS USING LOW OSMOLAR CONTRAST: ICD-10-PCS | Performed by: INTERNAL MEDICINE

## 2021-04-19 PROCEDURE — B2181ZZ FLUOROSCOPY OF LEFT INTERNAL MAMMARY BYPASS GRAFT USING LOW OSMOLAR CONTRAST: ICD-10-PCS | Performed by: INTERNAL MEDICINE

## 2021-04-19 PROCEDURE — B2111ZZ FLUOROSCOPY OF MULTIPLE CORONARY ARTERIES USING LOW OSMOLAR CONTRAST: ICD-10-PCS | Performed by: INTERNAL MEDICINE

## 2021-04-19 NOTE — NUR
RECEIVED PT BACK TO ROOM 2125, PT A/O X4, RESP EVEN AND NONLABORED ON RA. RT
GROIN DRESSING CDI, NO S/S OF BLEEDING OR HEMATOMA NOTED. PROVIDED PT WITH ICE
WATER AND APPLE JUICE. INSTRUCTED PT TO LAY FLAT FOR 2HRS. ALL NEEDS MET, CALL
LIGHT IN REACH.

## 2021-04-19 NOTE — NUR
AM ROUNDS- PT A/O X4, RESP EVEN AND NONLABORED ON RA. RT UPPER ARM SL. SB-51
ON TELE. CONSENTS SIGNED BY PT AND PLACED ON CHART. ALL NEEDS MET, CALL LIGHT
IN REACH.

## 2021-04-20 VITALS — SYSTOLIC BLOOD PRESSURE: 126 MMHG | DIASTOLIC BLOOD PRESSURE: 73 MMHG

## 2021-04-20 VITALS — SYSTOLIC BLOOD PRESSURE: 124 MMHG | DIASTOLIC BLOOD PRESSURE: 73 MMHG

## 2021-04-20 VITALS — SYSTOLIC BLOOD PRESSURE: 114 MMHG | DIASTOLIC BLOOD PRESSURE: 60 MMHG

## 2021-04-20 VITALS — SYSTOLIC BLOOD PRESSURE: 107 MMHG | DIASTOLIC BLOOD PRESSURE: 74 MMHG

## 2021-04-20 VITALS — DIASTOLIC BLOOD PRESSURE: 76 MMHG | SYSTOLIC BLOOD PRESSURE: 123 MMHG

## 2021-04-20 VITALS — DIASTOLIC BLOOD PRESSURE: 74 MMHG | SYSTOLIC BLOOD PRESSURE: 119 MMHG

## 2021-04-20 VITALS — DIASTOLIC BLOOD PRESSURE: 79 MMHG | SYSTOLIC BLOOD PRESSURE: 124 MMHG

## 2021-04-20 VITALS — SYSTOLIC BLOOD PRESSURE: 120 MMHG | DIASTOLIC BLOOD PRESSURE: 75 MMHG

## 2021-04-20 VITALS — SYSTOLIC BLOOD PRESSURE: 128 MMHG | DIASTOLIC BLOOD PRESSURE: 75 MMHG

## 2021-04-20 VITALS — DIASTOLIC BLOOD PRESSURE: 64 MMHG | SYSTOLIC BLOOD PRESSURE: 106 MMHG

## 2021-04-20 VITALS — DIASTOLIC BLOOD PRESSURE: 75 MMHG | SYSTOLIC BLOOD PRESSURE: 115 MMHG

## 2021-04-20 VITALS — SYSTOLIC BLOOD PRESSURE: 115 MMHG | DIASTOLIC BLOOD PRESSURE: 67 MMHG

## 2021-04-20 VITALS — DIASTOLIC BLOOD PRESSURE: 66 MMHG | SYSTOLIC BLOOD PRESSURE: 109 MMHG

## 2021-04-20 VITALS — SYSTOLIC BLOOD PRESSURE: 120 MMHG | DIASTOLIC BLOOD PRESSURE: 70 MMHG

## 2021-04-20 VITALS — DIASTOLIC BLOOD PRESSURE: 73 MMHG | SYSTOLIC BLOOD PRESSURE: 123 MMHG

## 2021-04-20 VITALS — SYSTOLIC BLOOD PRESSURE: 126 MMHG | DIASTOLIC BLOOD PRESSURE: 77 MMHG

## 2021-04-20 VITALS — SYSTOLIC BLOOD PRESSURE: 116 MMHG | DIASTOLIC BLOOD PRESSURE: 64 MMHG

## 2021-04-20 VITALS — SYSTOLIC BLOOD PRESSURE: 106 MMHG | DIASTOLIC BLOOD PRESSURE: 64 MMHG

## 2021-04-20 LAB
ALBUMIN SERPL-MCNC: 3.2 G/DL (ref 3.4–5)
ALP SERPL-CCNC: 72 U/L (ref 30–120)
ALT SERPL-CCNC: 19 U/L (ref 10–68)
ANION GAP SERPL CALC-SCNC: 14 MMOL/L (ref 8–16)
BASOPHILS NFR BLD AUTO: 0.8 % (ref 0–2)
BILIRUB SERPL-MCNC: 0.75 MG/DL (ref 0.2–1.3)
BUN SERPL-MCNC: 16 MG/DL (ref 7–18)
CALCIUM SERPL-MCNC: 8.6 MG/DL (ref 8.5–10.1)
CHLORIDE SERPL-SCNC: 106 MMOL/L (ref 98–107)
CK SERPL-CCNC: 32 UL (ref 21–232)
CO2 SERPL-SCNC: 23 MMOL/L (ref 21–32)
CREAT SERPL-MCNC: 1 MG/DL (ref 0.6–1.3)
EOSINOPHIL NFR BLD: 2.8 % (ref 0–7)
ERYTHROCYTE [DISTWIDTH] IN BLOOD BY AUTOMATED COUNT: 13.1 % (ref 11.5–14.5)
GLOBULIN SER-MCNC: 2.9 G/L
GLUCOSE SERPL-MCNC: 109 MG/DL (ref 74–106)
HCT VFR BLD CALC: 46.9 % (ref 42–54)
HGB BLD-MCNC: 15.8 G/DL (ref 13.5–17.5)
IMM GRANULOCYTES NFR BLD: 0.5 % (ref 0–5)
LYMPHOCYTES # BLD: 2.07 10X3/UL (ref 1.32–3.57)
LYMPHOCYTES NFR BLD AUTO: 23.5 % (ref 15–50)
MAGNESIUM SERPL-MCNC: 1.9 MG/DL (ref 1.8–2.4)
MCH RBC QN AUTO: 30.8 PG (ref 26–34)
MCHC RBC AUTO-ENTMCNC: 33.7 G/DL (ref 31–37)
MCV RBC: 91.4 FL (ref 80–100)
MONOCYTES NFR BLD: 11.2 % (ref 2–11)
NEUTROPHILS # BLD: 5.39 10X3/UL (ref 1.78–5.38)
NEUTROPHILS NFR BLD AUTO: 61.2 % (ref 40–80)
OSMOLALITY SERPL CALC.SUM OF ELEC: 279 MOSM/KG (ref 275–300)
PHOSPHATE SERPL-MCNC: 2.6 MG/DL (ref 2.5–4.9)
PLATELET # BLD: 219 10X3/UL (ref 130–400)
PMV BLD AUTO: 11.1 FL (ref 7.4–10.4)
POTASSIUM SERPL-SCNC: 4 MMOL/L (ref 3.5–5.1)
PROT SERPL-MCNC: 6.1 G/DL (ref 6.4–8.2)
RBC # BLD AUTO: 5.13 10X6/UL (ref 4.2–6.1)
SODIUM SERPL-SCNC: 139 MMOL/L (ref 136–145)
WBC # BLD AUTO: 8.8 10X3/UL (ref 4.8–10.8)

## 2021-04-20 PROCEDURE — 02H73JZ INSERTION OF PACEMAKER LEAD INTO LEFT ATRIUM, PERCUTANEOUS APPROACH: ICD-10-PCS | Performed by: THORACIC SURGERY (CARDIOTHORACIC VASCULAR SURGERY)

## 2021-04-20 PROCEDURE — 02HL3JZ INSERTION OF PACEMAKER LEAD INTO LEFT VENTRICLE, PERCUTANEOUS APPROACH: ICD-10-PCS | Performed by: THORACIC SURGERY (CARDIOTHORACIC VASCULAR SURGERY)

## 2021-04-20 PROCEDURE — 0JH606Z INSERTION OF PACEMAKER, DUAL CHAMBER INTO CHEST SUBCUTANEOUS TISSUE AND FASCIA, OPEN APPROACH: ICD-10-PCS | Performed by: THORACIC SURGERY (CARDIOTHORACIC VASCULAR SURGERY)

## 2021-04-20 NOTE — NUR
1950--
 
PT RESTING QUIETLY IN ROOM. DENIES ANY C/O AT PRESENT. VERBALIZES
UNDERSTANDING OF PACEMAKER PLACEMENT FOR 4/20/21. NPO AFTER MIDNIGHT.
UP AD STEPHAN TO JANE

## 2021-04-20 NOTE — NUR
SURGERY HERE TRANSPORTED TO OR PER STAFF.
WISHED WELL
NS/VANC INFUSING.
SKIN PREP DONE
REGLAN/PEPCID GIVEN.
WIFE AT BEDSIDE

## 2021-04-21 VITALS — SYSTOLIC BLOOD PRESSURE: 112 MMHG | DIASTOLIC BLOOD PRESSURE: 73 MMHG

## 2021-04-21 VITALS — SYSTOLIC BLOOD PRESSURE: 113 MMHG | DIASTOLIC BLOOD PRESSURE: 72 MMHG

## 2021-04-21 VITALS — SYSTOLIC BLOOD PRESSURE: 124 MMHG | DIASTOLIC BLOOD PRESSURE: 69 MMHG

## 2021-04-21 VITALS — SYSTOLIC BLOOD PRESSURE: 112 MMHG | DIASTOLIC BLOOD PRESSURE: 72 MMHG

## 2021-04-21 VITALS — SYSTOLIC BLOOD PRESSURE: 109 MMHG | DIASTOLIC BLOOD PRESSURE: 76 MMHG

## 2021-04-21 VITALS — SYSTOLIC BLOOD PRESSURE: 126 MMHG | DIASTOLIC BLOOD PRESSURE: 66 MMHG

## 2021-04-21 VITALS — SYSTOLIC BLOOD PRESSURE: 114 MMHG | DIASTOLIC BLOOD PRESSURE: 71 MMHG

## 2021-04-21 VITALS — DIASTOLIC BLOOD PRESSURE: 70 MMHG | SYSTOLIC BLOOD PRESSURE: 115 MMHG

## 2021-04-21 LAB
ALBUMIN SERPL-MCNC: 3.2 G/DL (ref 3.4–5)
ALP SERPL-CCNC: 77 U/L (ref 30–120)
ALT SERPL-CCNC: 3 U/L (ref 10–68)
ANION GAP SERPL CALC-SCNC: 11.3 MMOL/L (ref 8–16)
BASOPHILS NFR BLD AUTO: 0.5 % (ref 0–2)
BILIRUB SERPL-MCNC: 0.99 MG/DL (ref 0.2–1.3)
BUN SERPL-MCNC: 13 MG/DL (ref 7–18)
CALCIUM SERPL-MCNC: 8.3 MG/DL (ref 8.5–10.1)
CHLORIDE SERPL-SCNC: 103 MMOL/L (ref 98–107)
CK SERPL-CCNC: 47 UL (ref 21–232)
CO2 SERPL-SCNC: 28.5 MMOL/L (ref 21–32)
CREAT SERPL-MCNC: 1.1 MG/DL (ref 0.6–1.3)
EOSINOPHIL NFR BLD: 1.7 % (ref 0–7)
ERYTHROCYTE [DISTWIDTH] IN BLOOD BY AUTOMATED COUNT: 13.2 % (ref 11.5–14.5)
GLOBULIN SER-MCNC: 3 G/L
GLUCOSE SERPL-MCNC: 104 MG/DL (ref 74–106)
HCT VFR BLD CALC: 47.3 % (ref 42–54)
HGB BLD-MCNC: 15.5 G/DL (ref 13.5–17.5)
IMM GRANULOCYTES NFR BLD: 0.4 % (ref 0–5)
LYMPHOCYTES # BLD: 1.65 10X3/UL (ref 1.32–3.57)
LYMPHOCYTES NFR BLD AUTO: 15.7 % (ref 15–50)
MAGNESIUM SERPL-MCNC: 1.9 MG/DL (ref 1.8–2.4)
MCH RBC QN AUTO: 30.9 PG (ref 26–34)
MCHC RBC AUTO-ENTMCNC: 32.8 G/DL (ref 31–37)
MCV RBC: 94.2 FL (ref 80–100)
MONOCYTES NFR BLD: 10.3 % (ref 2–11)
NEUTROPHILS # BLD: 7.51 10X3/UL (ref 1.78–5.38)
NEUTROPHILS NFR BLD AUTO: 71.4 % (ref 40–80)
OSMOLALITY SERPL CALC.SUM OF ELEC: 277 MOSM/KG (ref 275–300)
PHOSPHATE SERPL-MCNC: 2.9 MG/DL (ref 2.5–4.9)
PLATELET # BLD: 215 10X3/UL (ref 130–400)
PMV BLD AUTO: 11.1 FL (ref 7.4–10.4)
POTASSIUM SERPL-SCNC: 3.8 MMOL/L (ref 3.5–5.1)
PROT SERPL-MCNC: 6.2 G/DL (ref 6.4–8.2)
RBC # BLD AUTO: 5.02 10X6/UL (ref 4.2–6.1)
SODIUM SERPL-SCNC: 139 MMOL/L (ref 136–145)
WBC # BLD AUTO: 10.5 10X3/UL (ref 4.8–10.8)

## 2021-04-21 NOTE — MORECARE
CASE MANAGEMENT DISCHARGE SUMMARY
 
 
PATIENT: KUMAR YOUNG                   UNIT: [id number]
ACCOUNT#: [id number]                       ADM DATE: 21
AGE: 86     : [birth year]  SEX: M            ROOM/BED: D.230    
AUTHOR: LATRICE,DOC                             PHYSICIAN:                               
 
REFERRING PHYSICIAN: SUNSHINE COTE MD                
DATE OF SERVICE: 21
Case Management Discharge Planning Summary
 
 
 
 
 
DCP REVIEW SUMMARY
ANTICIPATED D/C DATE: 
EXPECTED LOS : 
CASE STATUS:  DCP Initiated
INITIAL REVIEW:  2021
INITIAL REVIEWER:   Ghazal Stark
FINAL DISCHARGE DISPOSITION:  : 
FINAL REVIEWER:  
FINAL REVIEW DATE: 
 
  
 
DCP Focus Questions & Answers
 
 
QUESTION: ANSWER
 : 
 
 
 
 
 
PATIENT:  KUMAR YOUNG
ENCOUNTER:  [id number]
MEDICAL RECORD#:  [id number]
ADMISSION DATE:  2021
DISCHARGE DATE:  
ATTENDING MD:  SUNSHINE HEREDIA
:   1935-Mar-29
AGE:  86
MARITAL STATUS:   M
DC PLAN ID:  [id number]
 
FACILITY:   [facility]
PRINTED ON: 21  10:35  CT
 
 
 
 
 
 
 
 
**All edits/amendments must be made on the electronic document**
 
DICTATION DATE: 21 103     : DM  21 1035     
RPT#: [id number]                                DC DATE:        
                                               STATUS: ADM IN  
[facility]
 [place] [address]
***END OF REPORT***

## 2021-04-21 NOTE — NUR
ALEXSANDER DCD BY RN STUDENT EDILBERTO HARPER INSTRUCTIONS REVIEWED, SPOKE WITH DR MATEO CALHOUN WHO STATED SHE WOULD CALL PT WITH FOLLOW UP APPT

## 2021-04-21 NOTE — MORECARE
CASE MANAGEMENT DISCHARGE SUMMARY
 
 
PATIENT: KUMAR JIMENEZ                   UNIT: [id number]
ACCOUNT#: [id number]                       ADM DATE: 21
AGE: 86     : 37  SEX: M            ROOM/BED: D.2301    
AUTHOR: LATRICEDOC                             PHYSICIAN:                               
 
REFERRING PHYSICIAN: SUNSHINE COTE MD                
DATE OF SERVICE: 21
Case Management Discharge Planning Summary
 
 
COMMENTS 
ENTERED DATE:  21  10:42 CT
COMMENT TYPE:  Discharge Planning
REVIEWER: Ghazal Stark
After obtaining verbal consent, CM met with patient and spouse to discuss 
discharge planning / needs. CM discussed availability of home health, rehab 
services, and medical equipment. Patient states he plans to discharge to 
home where he lives with his wife. States the home environment is safe. 
Denies need for home health or DME. States his wife, Josselyn, will drive him 
home upon hospital discharge. States his PCP will be Dr. Branham.  He has an 
appointment with Dr. Branham next week to get re-established as a patient. 
CM will continue to follow and assist as needed with discharge planning 
needs.
 
 
DCP REVIEW SUMMARY
ANTICIPATED D/C DATE: 
EXPECTED LOS : 
CASE STATUS:  DCP Initiated
INITIAL REVIEW:  2021
INITIAL REVIEWER:   Ghazal Stark
FINAL DISCHARGE DISPOSITION:  : 
FINAL REVIEWER:  
FINAL REVIEW DATE: 
 
  
 
DCP Focus Questions & Answers
 
DCP Screen
QUESTION: ANSWER
High Risk Factors: : None
Walking limitation:  Patient stated self rated walking limitation present? : 
No
Age: : 80 +
Prior living environment: : Lives with others
Disability ranking: : Grade 1:  No significant disability
 
 
 
 
DCP Evaluation
QUESTION: ANSWER
Patient and/or caregiver agree upon recommended discharge plan? : Yes
Family / Caregiver's ability to cope with chronic illness: : a. Adequate 
(ability to meet patient's medical needs, ensures patient attends medical 
appts.)
Patient's current cognitive status: : *Oriented to person, place, situation, 
time and present
Patient's ability to cope with chronic illness : a. Adequate (0-3 ED visits 
in 6 mos., adequate financial resources, attends scheduled appts.)
Does the patient have the ability to pay for or attain post discharge needs 
/ services? : Yes
Functional screen assessment: : Basic needs can adequately be met by self
Family / Caregiver's ability to cope with chronic illness: : a. Adequate 
(ability to meet patient's medical needs, ensures patient attends medical 
appts.)
Physical Status: : Independent with ADL's
Equipment needed for post hospitalization: : None
Is there a likelihood that the patient will require additional services to 
return to the preadmission environment? : No
Living Arrangements: : Home with Spouse/Significant Other
Results of this evaluation have been discussed with: : Significant other
Results of this evaluation have been discussed with: : Patient
Patient with capacity for self-care or can be cared for in same environment 
as prior to hospitalization? : Yes
Baseline cognitive status: : *Oriented to person, place, situation, time and 
present
Physical environment modification needed / anticipated for discharge: : No
Preadmission facility can/cannot provide post hospital level of care needs: 
: Can - at same level of care as preadmission
Medication Management: : Patient states can afford medications
Planned post hospital services available for patient? : Yes
Pharmacy name(s): : [facility] at [place]
Planned post hospital services covered by insurance plan? : Yes
Does Patient have transportation to get home and to follow-up medical 
appointments when discharged from the hospital? : Yes
Comments: : Wife, Josselyn Jimenez, will provide transportation until patient is 
released to drive again.
Would patient like to participate in any Care Coordination programs (if 
applicable): : Not applicable
Does the patient have electricity at home? : Yes
Does the patient have running water in their house? : Yes
Equipment in use: : None
Mental health screen: : No mental health history
Psychosocial status: : Independent adult (65+)
Abuse/Neglect: : None
Resources / Services in place: : None
 
 
 
 
DCP Re-evaluation
QUESTION: ANSWER
Would patient like to participate in any Care Coordination programs (if 
applicable): : Not applicable
 
 
 
 
 
 
 
PATIENT:  KUMAR JIMENEZ
ENCOUNTER:  [id number]
MEDICAL RECORD#:  [id number]
ADMISSION DATE:  2021
DISCHARGE DATE:  
ATTENDING MD:  SUNSHINE HEREDIA
:   1935-Mar-29
AGE:  86
MARITAL STATUS:   M
DC PLAN ID:  [id number]
FACILITY:   [facility]
PRINTED ON: 21  10:48  CT
 
 
 
 
 
 
 
 
**All edits/amendments must be made on the electronic document**
 
DICTATION DATE: 21     : RODRIGUEZ  21 104     
RPT#: [id number]                                DC DATE:        
                                               STATUS: ADM IN  
[facility]
 [place] [address]
***END OF REPORT***

## 2021-04-21 NOTE — OP
PATIENT NAME:  KUMAR YOUNG                         MEDICAL RECORD: [id number]
:37                                             LOCATION:D.[facility]    D.2301
                                                         ADMISSION DATE:21
SURGEON:  MONTANA GUTIERREZ MD            
 
 
DATE OF OPERATION:  2021
 
SURGEON:  [place] Gutierrez MD
 
PROCEDURE PERFORMED:  Insertion of dual chamber permanent pacemaker via left
subclavian vein.
 
PREOPERATIVE DIAGNOSES:  Coronary artery disease status post coronary bypass
graft and sick sinus syndrome with symptomatic bradycardia.
 
POSTOPERATIVE DIAGNOSES:  Coronary artery disease status post coronary bypass
graft and sick sinus syndrome with symptomatic bradycardia.
 
ANESTHESIA:  General.
 
SPECIMENS:  None.
 
BLOOD LOSS:  20 cc.
 
COMPLICATIONS:  None.
 
CONDITION:  Stable.
 
DISPOSITION:  ICU.
 
OPERATIVE FINDINGS:  Initially, the guidewire kept going up the left jugular
requiring replacement more laterally of the access and the initially placed
ventricular screw in lead would not retract the screw to attempt another pacing
site.  Therefore, it was changed to a tined lead requiring placement of another
stick site introducer, but no apparent bleeding complication.
 
Good pacing and sensing thresholds.
 
INDICATION:  Bradycardia.
 
PROCEDURE IN DETAIL:  The patient was brought to the operating suite.  General
anesthesia was obtained, the patient was prepped and draped.  A pocket was made
below the left clavicle.  Subclavian vein was cannulated twice.  Guide wire
passed across the midline from the more medial stick site with a lateral stick
site kept going up the jugular therefore it was slightly more laterally. 
Eventually peel-away sheath was used.  The leads were placed.  Initially, the
ventricular wire was placed; however, the thresholds were not good, but the lead
could be removed, but the screw would not retract fully nor would it extend
eventually was abandoned by removing the lead and holding direct pressure for 5
minutes, then more laterally another stick site was made.  Dilator and
introducer was placed.  Again, initially the guidewire tried to go up the
jugular, but eventually crossed the midline.  The lead was placed with
appropriate sensing and pacing threshold sutured into place.  The atrial J-wire
was used and the lead was placed with good sensing and pacing threshold again
sutured into place.  Thorough antibiotic irrigation of the pocket was performed.
 The leads were connected to the pacemaker generator, carefully coiled under the
pacemaker generator and the pocket was again irrigated.  The wound was closed in
 
 
 
OPERATIVE REPORT                               [id number]    HANNAHKUMAR       
 
 
3 layers.  Dermabond was used on the skin.  Chest x-ray pending.
 
TRANSINT:[id number] Voice Confirmation ID: [id number] DOCUMENT ID: [id number]
                                           
                                           MONTANA GUTIERREZ MD            
 
 
 
Electronically Signed by MONTANA GUTIERREZ on 21 at 0943
 
 
 
 
 
 
 
 
 
 
 
 
 
 
 
 
 
 
 
 
 
 
 
 
 
 
 
 
 
 
 
 
 
 
 
 
 
 
CC: AZEB RAMIREZ M.D.                                        [id number]
DICTATION DATE: 21 1546     :     21 2304      ADM IN  
                                                                              
[id number] [place] [address]

## 2021-04-22 NOTE — MORECARE
CASE MANAGEMENT DISCHARGE SUMMARY
 
 
PATIENT: KUMAR JIMENEZ                   UNIT: [id number]
ACCOUNT#: [id number]                       ADM DATE: 21
AGE: 86     : 37  SEX: M            ROOM/BED: D.2301    
AUTHOR: LATRICEDOC                             PHYSICIAN:                               
 
REFERRING PHYSICIAN: SUNSHINE COTE MD                
DATE OF SERVICE: 21
Case Management Discharge Planning Summary
 
 
COMMENTS 
ENTERED DATE:  21  10:42 CT
COMMENT TYPE:  Discharge Planning
REVIEWER: Ghazal Stark
After obtaining verbal consent, CM met with patient and spouse to discuss 
discharge planning / needs. CM discussed availability of home health, rehab 
services, and medical equipment. Patient states he plans to discharge to 
home where he lives with his wife. States the home environment is safe. 
Denies need for home health or DME. States his wife, Josselyn, will drive him 
home upon hospital discharge. States his PCP will be Dr. Branham.  He has an 
appointment with Dr. Branham next week to get re-established as a patient. 
CM will continue to follow and assist as needed with discharge planning 
needs.
 
 
DCP REVIEW SUMMARY
ANTICIPATED D/C DATE: 
EXPECTED LOS : 
CASE STATUS:  DCP Initiated
INITIAL REVIEW:  2021
INITIAL REVIEWER:   Ghazal Stark
FINAL DISCHARGE DISPOSITION:  : 
FINAL REVIEWER:  
FINAL REVIEW DATE: 
 
  
 
DCP Focus Questions & Answers
 
DCP Screen
QUESTION: ANSWER
High Risk Factors: : None
Walking limitation:  Patient stated self rated walking limitation present? : 
No
Age: : 80 +
Prior living environment: : Lives with others
Disability ranking: : Grade 1:  No significant disability
 
 
 
 
DCP Evaluation
QUESTION: ANSWER
Patient and/or caregiver agree upon recommended discharge plan? : Yes
Family / Caregiver's ability to cope with chronic illness: : a. Adequate 
(ability to meet patient's medical needs, ensures patient attends medical 
appts.)
Patient's current cognitive status: : *Oriented to person, place, situation, 
time and present
Patient's ability to cope with chronic illness : a. Adequate (0-3 ED visits 
in 6 mos., adequate financial resources, attends scheduled appts.)
Does the patient have the ability to pay for or attain post discharge needs 
/ services? : Yes
Functional screen assessment: : Basic needs can adequately be met by self
Family / Caregiver's ability to cope with chronic illness: : a. Adequate 
(ability to meet patient's medical needs, ensures patient attends medical 
appts.)
Physical Status: : Independent with ADL's
Equipment needed for post hospitalization: : None
Is there a likelihood that the patient will require additional services to 
return to the preadmission environment? : No
Living Arrangements: : Home with Spouse/Significant Other
Results of this evaluation have been discussed with: : Significant other
Results of this evaluation have been discussed with: : Patient
Patient with capacity for self-care or can be cared for in same environment 
as prior to hospitalization? : Yes
Baseline cognitive status: : *Oriented to person, place, situation, time and 
present
Physical environment modification needed / anticipated for discharge: : No
Preadmission facility can/cannot provide post hospital level of care needs: 
: Can - at same level of care as preadmission
Medication Management: : Patient states can afford medications
Planned post hospital services available for patient? : Yes
Pharmacy name(s): : [facility] [place]
Planned post hospital services covered by insurance plan? : Yes
Does Patient have transportation to get home and to follow-up medical 
appointments when discharged from the hospital? : Yes
Comments: : Wife, Josselyn Jimenez, will provide transportation until patient is 
released to drive again.
Would patient like to participate in any Care Coordination programs (if 
applicable): : Not applicable
Does the patient have electricity at home? : Yes
Does the patient have running water in their house? : Yes
Equipment in use: : None
Mental health screen: : No mental health history
Psychosocial status: : Independent adult (65+)
Abuse/Neglect: : None
Resources / Services in place: : None
 
 
 
 
DCP Re-evaluation
QUESTION: ANSWER
Would patient like to participate in any Care Coordination programs (if 
applicable): : Not applicable
 
 
 
 
 
 
 
PATIENT:  KUMAR JIMENEZ
ENCOUNTER:  [id number]
MEDICAL RECORD#:  [id number]
ADMISSION DATE:  2021
DISCHARGE DATE:  2021
ATTENDING MD:  SUNSHINE HEREDIA
:   1935-Mar-29
AGE:  86
MARITAL STATUS:   M
DC PLAN ID:  [id number]
FACILITY:   [facility]
PRINTED ON: 21  10:59  CT
 
 
 
 
 
 
 
 
**All edits/amendments must be made on the electronic document**
 
DICTATION DATE: 21     : RODRIGUEZ  21 105     
RPT#: [id number]                                DC DATE:21
                                               STATUS: DIS IN  
[facility]
 [address]
[place] [address]
***END OF REPORT***

## 2022-11-12 NOTE — NUR
SALINE LOCK TO LEFT FOREARM, 20G X1 ATTEMPT. TOLERATED WELL.
STATES THAT HE SLEPT WELL LAST NOC. NO NEEDS VOICED. unable to assess 2/2 poor standing tolerance